# Patient Record
Sex: MALE | Race: WHITE | Employment: FULL TIME | URBAN - METROPOLITAN AREA
[De-identification: names, ages, dates, MRNs, and addresses within clinical notes are randomized per-mention and may not be internally consistent; named-entity substitution may affect disease eponyms.]

---

## 2020-01-07 PROBLEM — I10 HTN (HYPERTENSION): Status: ACTIVE | Noted: 2020-01-07

## 2020-01-07 PROBLEM — Z00.00 ANNUAL PHYSICAL EXAM: Status: ACTIVE | Noted: 2020-01-07

## 2020-01-07 PROBLEM — F17.210 TOBACCO DEPENDENCE DUE TO CIGARETTES: Status: ACTIVE | Noted: 2020-01-07

## 2020-01-07 PROBLEM — F39 MOOD DISORDER (HCC): Status: ACTIVE | Noted: 2020-01-07

## 2020-02-20 PROBLEM — E78.5 HLD (HYPERLIPIDEMIA): Status: ACTIVE | Noted: 2020-02-20

## 2020-10-06 PROBLEM — C06.9 ORAL CANCER (HCC): Status: ACTIVE | Noted: 2020-10-06

## 2021-07-01 PROBLEM — F39 MOOD DISORDER (HCC): Status: RESOLVED | Noted: 2020-01-07 | Resolved: 2021-07-01

## 2021-07-01 PROBLEM — F10.10 ALCOHOL USE DISORDER, MILD, ABUSE: Status: ACTIVE | Noted: 2021-07-01

## 2021-11-01 ENCOUNTER — HOSPITAL ENCOUNTER (OUTPATIENT)
Dept: GENERAL RADIOLOGY | Age: 51
Discharge: HOME | End: 2021-11-01
Payer: COMMERCIAL

## 2021-11-01 DIAGNOSIS — S89.91XA KNEE INJURY, RIGHT, INITIAL ENCOUNTER: ICD-10-CM

## 2021-11-01 PROBLEM — B36.9 FUNGAL DERMATITIS: Status: ACTIVE | Noted: 2021-11-01

## 2021-11-01 PROCEDURE — 73562 X-RAY EXAM OF KNEE 3: CPT

## 2022-01-27 PROBLEM — M19.90 OA (OSTEOARTHRITIS): Status: ACTIVE | Noted: 2022-01-27

## 2022-03-18 PROBLEM — F10.10 ALCOHOL USE DISORDER, MILD, ABUSE: Status: ACTIVE | Noted: 2021-07-01

## 2022-03-19 PROBLEM — E78.5 HLD (HYPERLIPIDEMIA): Status: ACTIVE | Noted: 2020-02-20

## 2022-03-19 PROBLEM — Z00.00 ANNUAL PHYSICAL EXAM: Status: ACTIVE | Noted: 2020-01-07

## 2022-03-19 PROBLEM — I10 HTN (HYPERTENSION): Status: ACTIVE | Noted: 2020-01-07

## 2022-03-19 PROBLEM — M19.90 OA (OSTEOARTHRITIS): Status: ACTIVE | Noted: 2022-01-27

## 2022-03-19 PROBLEM — F17.210 TOBACCO DEPENDENCE DUE TO CIGARETTES: Status: ACTIVE | Noted: 2020-01-07

## 2022-03-20 PROBLEM — B36.9 FUNGAL DERMATITIS: Status: ACTIVE | Noted: 2021-11-01

## 2022-03-20 PROBLEM — C06.9 ORAL CANCER (HCC): Status: ACTIVE | Noted: 2020-10-06

## 2022-05-24 DIAGNOSIS — E78.5 HYPERLIPIDEMIA, UNSPECIFIED HYPERLIPIDEMIA TYPE: ICD-10-CM

## 2022-05-24 DIAGNOSIS — I10 HYPERTENSION, UNSPECIFIED TYPE: Primary | ICD-10-CM

## 2022-07-29 ENCOUNTER — TELEPHONE (OUTPATIENT)
Dept: FAMILY MEDICINE CLINIC | Facility: CLINIC | Age: 52
End: 2022-07-29

## 2022-08-01 DIAGNOSIS — I10 HYPERTENSION, UNSPECIFIED TYPE: Primary | ICD-10-CM

## 2022-08-01 NOTE — TELEPHONE ENCOUNTER
Yes he needs an appointment; see below: Follow-up and Dispositions    Return in about 6 months (around 7/27/2022) for CPX/AWV, Pre-Visit LABS.     For today will provide him with a 30 D supply of Losartan in order for him to have time to schedule labs & visit; tks    Attempted to sign script but there is no pharmacy on file; will need that in order to send script

## 2022-08-01 NOTE — TELEPHONE ENCOUNTER
Pt was last prescribed Olmesartan rather than Losartan (Cozaar) a few months ago. Please confirm w/pt which med he's been taking for the last few months & the dosage so we can make sure the RF is for the correct med.  Tks

## 2022-08-01 NOTE — TELEPHONE ENCOUNTER
Needs a refill on losartan. He hasn't been seen for hypertension since march. And has not future appointment to follow up with his hypertension. Would you like for him to have an appointment?

## 2022-08-02 DIAGNOSIS — E78.5 HYPERLIPIDEMIA, UNSPECIFIED HYPERLIPIDEMIA TYPE: ICD-10-CM

## 2022-08-02 DIAGNOSIS — I10 HYPERTENSION, UNSPECIFIED TYPE: ICD-10-CM

## 2022-08-03 DIAGNOSIS — I10 HYPERTENSION, UNSPECIFIED TYPE: ICD-10-CM

## 2022-08-03 RX ORDER — LOSARTAN POTASSIUM 50 MG/1
50 TABLET ORAL DAILY
Qty: 30 TABLET | Refills: 0 | Status: SHIPPED | OUTPATIENT
Start: 2022-08-03 | End: 2022-08-03 | Stop reason: SDUPTHER

## 2022-08-03 RX ORDER — LOSARTAN POTASSIUM 50 MG/1
50 TABLET ORAL DAILY
Qty: 30 TABLET | Refills: 0 | Status: SHIPPED | OUTPATIENT
Start: 2022-08-03 | End: 2022-08-12 | Stop reason: SDUPTHER

## 2022-08-03 NOTE — TELEPHONE ENCOUNTER
Needs refill on losartan, Went to pharmacy to pick it up and it wasn't there. Can you resent the losartan order.

## 2022-08-05 LAB
ALBUMIN SERPL-MCNC: 3.9 G/DL (ref 3.5–5)
ALBUMIN/GLOB SERPL: 1.1 {RATIO} (ref 1.2–3.5)
ALP SERPL-CCNC: 65 U/L (ref 50–136)
ALT SERPL-CCNC: 28 U/L (ref 12–65)
ANION GAP SERPL CALC-SCNC: 6 MMOL/L (ref 7–16)
AST SERPL-CCNC: 32 U/L (ref 15–37)
BILIRUB SERPL-MCNC: 0.6 MG/DL (ref 0.2–1.1)
BUN SERPL-MCNC: 7 MG/DL (ref 6–23)
CALCIUM SERPL-MCNC: 9.1 MG/DL (ref 8.3–10.4)
CHLORIDE SERPL-SCNC: 101 MMOL/L (ref 98–107)
CHOLEST SERPL-MCNC: 205 MG/DL
CO2 SERPL-SCNC: 29 MMOL/L (ref 21–32)
CREAT SERPL-MCNC: 0.8 MG/DL (ref 0.8–1.5)
GLOBULIN SER CALC-MCNC: 3.4 G/DL (ref 2.3–3.5)
GLUCOSE SERPL-MCNC: 72 MG/DL (ref 65–100)
HDLC SERPL-MCNC: 59 MG/DL (ref 40–60)
HDLC SERPL: 3.5 {RATIO}
LDLC SERPL CALC-MCNC: 111.2 MG/DL
POTASSIUM SERPL-SCNC: 4.5 MMOL/L (ref 3.5–5.1)
PROT SERPL-MCNC: 7.3 G/DL (ref 6.3–8.2)
SODIUM SERPL-SCNC: 136 MMOL/L (ref 138–145)
TRIGL SERPL-MCNC: 174 MG/DL (ref 35–150)
VLDLC SERPL CALC-MCNC: 34.8 MG/DL (ref 6–23)

## 2022-08-12 ENCOUNTER — OFFICE VISIT (OUTPATIENT)
Dept: FAMILY MEDICINE CLINIC | Facility: CLINIC | Age: 52
End: 2022-08-12
Payer: COMMERCIAL

## 2022-08-12 VITALS
TEMPERATURE: 97.1 F | OXYGEN SATURATION: 97 % | HEART RATE: 73 BPM | WEIGHT: 142.2 LBS | SYSTOLIC BLOOD PRESSURE: 155 MMHG | HEIGHT: 67 IN | BODY MASS INDEX: 22.32 KG/M2 | DIASTOLIC BLOOD PRESSURE: 86 MMHG

## 2022-08-12 DIAGNOSIS — Z00.00 ANNUAL PHYSICAL EXAM: Primary | ICD-10-CM

## 2022-08-12 DIAGNOSIS — E78.5 HYPERLIPIDEMIA, UNSPECIFIED HYPERLIPIDEMIA TYPE: ICD-10-CM

## 2022-08-12 DIAGNOSIS — F10.10 ALCOHOL USE DISORDER, MILD, ABUSE: ICD-10-CM

## 2022-08-12 DIAGNOSIS — I10 HYPERTENSION, UNSPECIFIED TYPE: ICD-10-CM

## 2022-08-12 DIAGNOSIS — M15.9 OSTEOARTHRITIS OF MULTIPLE JOINTS, UNSPECIFIED OSTEOARTHRITIS TYPE: ICD-10-CM

## 2022-08-12 DIAGNOSIS — F17.210 TOBACCO DEPENDENCE DUE TO CIGARETTES: ICD-10-CM

## 2022-08-12 PROBLEM — Z85.819 HISTORY OF ORAL CANCER: Status: ACTIVE | Noted: 2020-10-06

## 2022-08-12 PROCEDURE — 99396 PREV VISIT EST AGE 40-64: CPT | Performed by: FAMILY MEDICINE

## 2022-08-12 PROCEDURE — 99214 OFFICE O/P EST MOD 30 MIN: CPT | Performed by: FAMILY MEDICINE

## 2022-08-12 RX ORDER — FOLIC ACID 1 MG/1
1 TABLET ORAL DAILY
Qty: 90 TABLET | Refills: 1 | Status: SHIPPED | OUTPATIENT
Start: 2022-08-12

## 2022-08-12 RX ORDER — ROSUVASTATIN CALCIUM 10 MG/1
10 TABLET, COATED ORAL EVERY EVENING
Qty: 90 TABLET | Refills: 1 | Status: SHIPPED | OUTPATIENT
Start: 2022-08-12

## 2022-08-12 RX ORDER — THIAMINE MONONITRATE (VIT B1) 100 MG
100 TABLET ORAL DAILY
COMMUNITY

## 2022-08-12 RX ORDER — DICLOFENAC SODIUM 75 MG/1
75 TABLET, DELAYED RELEASE ORAL 2 TIMES DAILY PRN
Qty: 60 TABLET | Refills: 5 | Status: SHIPPED | OUTPATIENT
Start: 2022-08-12

## 2022-08-12 RX ORDER — LOSARTAN POTASSIUM 100 MG/1
100 TABLET ORAL DAILY
Qty: 90 TABLET | Refills: 1 | Status: SHIPPED | OUTPATIENT
Start: 2022-08-12

## 2022-08-12 RX ORDER — LANOLIN ALCOHOL/MO/W.PET/CERES
100 CREAM (GRAM) TOPICAL DAILY
Qty: 90 TABLET | Refills: 1 | Status: SHIPPED | OUTPATIENT
Start: 2022-08-12

## 2022-08-12 ASSESSMENT — ENCOUNTER SYMPTOMS
COLOR CHANGE: 0
SHORTNESS OF BREATH: 0
CONSTIPATION: 0
BLOOD IN STOOL: 0
ABDOMINAL PAIN: 0
DIARRHEA: 0

## 2022-08-12 ASSESSMENT — PATIENT HEALTH QUESTIONNAIRE - PHQ9
SUM OF ALL RESPONSES TO PHQ9 QUESTIONS 1 & 2: 0
2. FEELING DOWN, DEPRESSED OR HOPELESS: 0
SUM OF ALL RESPONSES TO PHQ QUESTIONS 1-9: 0
1. LITTLE INTEREST OR PLEASURE IN DOING THINGS: 0
SUM OF ALL RESPONSES TO PHQ QUESTIONS 1-9: 0

## 2022-08-12 NOTE — PROGRESS NOTES
[facility]  [address] [place] [address]  Phone: [phone]  Fax: [phone]  [email or web address]      Encounter 5 [facility]; Established patient 45 y.o.male; seen 8/12/2022 for: Annual Exam, Hypertension, and Arthritis      Assessment & Plan    1. Annual physical exam  Assessment & Plan:  Discussed ideal body weight and encouraged regular physical activity and healthy diet. Recommended routine preventative measures such as always wearing seatbelt & home safety measures. Counseled the patient regarding the rationale for the recommended immunizations and screenings and they are current and/or updated. 2. Hypertension, unspecified type  Assessment & Plan:  Problem and/or Symptoms are currently not stable and/or well controlled on current treatment plan. Will have patient follow up as directed and make the following changes for further evaluation and/or treatment:     Increasing Losartan from 75 mg QD to 100 mg QD & f/u response next visit  Orders:  -     losartan (COZAAR) 100 MG tablet; Take 1 tablet by mouth in the morning., Disp-90 tablet, R-1Normal  -     Comprehensive Metabolic Panel; Future  3. Hyperlipidemia, unspecified hyperlipidemia type  Assessment & Plan:  Problem and/or Symptoms are currently not stable and/or well controlled on current treatment plan. Will have patient follow up as directed and make the following changes for further evaluation and/or treatment:     Starting Crestor 10 mg QD & f/u response next visit  Orders:  -     rosuvastatin (CRESTOR) 10 MG tablet; Take 1 tablet by mouth every evening, Disp-90 tablet, R-1Normal  -     Comprehensive Metabolic Panel; Future  -     Lipid Panel; Future  4. Osteoarthritis of multiple joints, unspecified osteoarthritis type  Assessment & Plan:  Problem and/or Symptoms are currently stable and/or improving on current treatment plan. Will continue and have patient follow up as directed.     Pertinent labs reviewed & pertinent meds refilled X 6 M    Orders:  -     diclofenac (VOLTAREN) 75 MG EC tablet; Take 1 tablet by mouth 2 times daily as needed for Pain, Disp-60 tablet, R-5Normal  5. Tobacco dependence due to cigarettes  Assessment & Plan:  Advised patient regarding the deleterious effects of tobacco use including increased risk of: stroke, heart attack, high blood pressure, cancer, etc. Patient voices understanding of risks and acknowledges it would be better if the patient avoids further tobacco exposure. Discussed with patient possible options to help with tobacco cessation including: nicotine gum/patches/vapor and medication options. 6. Alcohol use disorder, mild, abuse  Assessment & Plan:  Problem and/or Symptoms are currently stable and/or improving on current treatment plan. Will continue and have patient follow up as directed. RF supplements X 6 M  Orders:  -     thiamine 100 MG tablet; Take 1 tablet by mouth in the morning., Disp-90 tablet, [id number]  -     folic acid (FOLVITE) 1 MG tablet; Take 1 tablet by mouth in the morning., Disp-90 tablet, R-1Normal        Check Out Instructions  Return in about 6 months (around 2/12/2023) for Virtual Visit, Previsit Vitals and Labs. Subjective & Objective    HPI  Patient states that chronic health problems are stable on current regimens and has no acute concerns today except as mentioned. Review of Systems   Constitutional:  Negative for fatigue and unexpected weight change. Eyes:  Negative for visual disturbance. Respiratory:  Negative for shortness of breath. Cardiovascular:  Negative for chest pain. Gastrointestinal:  Negative for abdominal pain, blood in stool, constipation and diarrhea. Genitourinary:  Negative for difficulty urinating and hematuria. Musculoskeletal:  Negative for arthralgias and myalgias. Skin:  Negative for color change. Neurological:  Negative for weakness and headaches.    Psychiatric/Behavioral:  Negative for dysphoric mood. The patient is not nervous/anxious. Physical Exam  Vitals and nursing note reviewed. Constitutional:       Appearance: Normal appearance. HENT:      Head: Normocephalic and atraumatic. Cardiovascular:      Rate and Rhythm: Normal rate and regular rhythm. Pulses: Normal pulses. Heart sounds: Normal heart sounds. Pulmonary:      Effort: Pulmonary effort is normal. No respiratory distress. Breath sounds: Normal breath sounds. Musculoskeletal:         General: No deformity. Neurological:      Mental Status: He is alert. Mental status is at baseline. Psychiatric:         Mood and Affect: Mood normal.         Behavior: Behavior normal.     Vitals:    08/12/22 0837 08/12/22 0839   BP: (!) 175/92 (!) 155/86   Site: Left Upper Arm    Position: Sitting    Cuff Size: Large Adult    Pulse: 73    Temp: 97.1 °F (36.2 °C)    TempSrc: Temporal    SpO2: 97%    Weight: 142 lb 3.2 oz (64.5 kg)    Height: 5' 7\" (1.702 m)      BP Readings from Last 3 Encounters:   08/12/22 (!) 155/86   03/11/22 137/79   11/01/21 (!) 144/88     Body mass index is 22.27 kg/m². Wt Readings from Last 3 Encounters:   08/12/22 142 lb 3.2 oz (64.5 kg)   11/01/21 146 lb 12.8 oz (66.6 kg)   07/01/21 148 lb 9.6 oz (67.4 kg)       We discussed the typical prognosis and potential complications of the concern(s), including treatment options. Medication risks, benefits, costs, interactions, and alternatives were discussed as appropriate. I advised him to contact the office if his condition worsens or fails to improve as anticipated. He expressed understanding with the discussion and plan of care. An electronic signature was used to authenticate this note.   -- Hemalatha Morgan MD       Health Maintenance    Vaccinations (most recent date)  Influenza (Yearly): declines  Tetanus (Q10 Years): 2017  Shingles (Age 52+): advised to get  Pneumovax (Age 65+): 2020    Cancer Screening (most recent date)  Colon (Age 45-75): Cologuard in 2020; repeat in 2023  Breast (Age 39-70 Q2 Years): na  Cervical (Age 21-29 Pap Q3 Years): na  Cervical (Age 33-67 HPV Q5 Years): na    Disease Screening (most recent date)  Cholesterol (Age 21-72 Q8 Years): today  Diabetes (Age 38-68 Q2 Years): today  T/A/D Use:  reports that he has been smoking cigarettes. He has been smoking an average of .5 packs per day. He has never used smokeless tobacco. He reports current alcohol use of about 2.0 standard drinks per week. He reports that he does not use drugs. STD's (Yearly): declines  Bone Density (Age 65+ if female): na  AAA Screen (Age 73-68 if male & ever smoked): na    No flowsheet data found. Other Providers Seen  Patient Care Team:  Bishnu Reaves MD as PCP - General  Bishnu Reaves MD as PCP - [facility] Empaneled Provider    Risk Factors  Risk factors (depression, fall risk, smoking, poor nutrition, physical inactivity, obesity, etc) were identified and appropriate counseling given and/or referrals were made as appropriate. If no counseling given and/or referrals were made then no risk factors as listed were identified or there is already a treatment plan in place to address said risk factors. The patient's chart was reviewed and updated as appropriate including: problem list, current medications, past medical, surgical, & social history, family history, allergies, & care teams.

## 2022-08-12 NOTE — ASSESSMENT & PLAN NOTE
Problem and/or Symptoms are currently stable and/or improving on current treatment plan. Will continue and have patient follow up as directed.     RF supplements X 6 M

## 2022-08-12 NOTE — ASSESSMENT & PLAN NOTE
Advised patient regarding the deleterious effects of tobacco use including increased risk of: stroke, heart attack, high blood pressure, cancer, etc. Patient voices understanding of risks and acknowledges it would be better if the patient avoids further tobacco exposure. Discussed with patient possible options to help with tobacco cessation including: nicotine gum/patches/vapor and medication options.

## 2022-09-21 ENCOUNTER — TELEPHONE (OUTPATIENT)
Dept: FAMILY MEDICINE CLINIC | Facility: CLINIC | Age: 52
End: 2022-09-21

## 2022-09-21 NOTE — TELEPHONE ENCOUNTER
Patient called just let you know that he was bitten by a dog while working yesterday. He went to  to get the wound cleaned up. He said that he did call animal control to let them know. They were suppose to go talk to the owners but has not hurt anything. Stated there was no signs of infections. Pet owners said that he is updated on his shot but he has no proof.

## 2022-09-21 NOTE — TELEPHONE ENCOUNTER
Noted; tks for the update. If he develops any Sx of infection he should be seen to evaluate; otherwise just conservative care for the wound.

## 2022-10-20 ENCOUNTER — TELEPHONE (OUTPATIENT)
Dept: FAMILY MEDICINE CLINIC | Facility: CLINIC | Age: 52
End: 2022-10-20

## 2022-10-21 ENCOUNTER — OFFICE VISIT (OUTPATIENT)
Dept: FAMILY MEDICINE CLINIC | Facility: CLINIC | Age: 52
End: 2022-10-21
Payer: COMMERCIAL

## 2022-10-21 VITALS
SYSTOLIC BLOOD PRESSURE: 154 MMHG | BODY MASS INDEX: 22.44 KG/M2 | HEIGHT: 67 IN | HEART RATE: 90 BPM | DIASTOLIC BLOOD PRESSURE: 84 MMHG | TEMPERATURE: 98 F | WEIGHT: 143 LBS

## 2022-10-21 DIAGNOSIS — M25.561 PAIN AND SWELLING OF RIGHT KNEE: Primary | ICD-10-CM

## 2022-10-21 DIAGNOSIS — I10 HYPERTENSION, UNSPECIFIED TYPE: ICD-10-CM

## 2022-10-21 DIAGNOSIS — M25.461 PAIN AND SWELLING OF RIGHT KNEE: Primary | ICD-10-CM

## 2022-10-21 PROCEDURE — 99214 OFFICE O/P EST MOD 30 MIN: CPT | Performed by: NURSE PRACTITIONER

## 2022-10-21 SDOH — ECONOMIC STABILITY: FOOD INSECURITY: WITHIN THE PAST 12 MONTHS, YOU WORRIED THAT YOUR FOOD WOULD RUN OUT BEFORE YOU GOT MONEY TO BUY MORE.: NEVER TRUE

## 2022-10-21 SDOH — ECONOMIC STABILITY: FOOD INSECURITY: WITHIN THE PAST 12 MONTHS, THE FOOD YOU BOUGHT JUST DIDN'T LAST AND YOU DIDN'T HAVE MONEY TO GET MORE.: NEVER TRUE

## 2022-10-21 ASSESSMENT — PATIENT HEALTH QUESTIONNAIRE - PHQ9
1. LITTLE INTEREST OR PLEASURE IN DOING THINGS: 0
SUM OF ALL RESPONSES TO PHQ QUESTIONS 1-9: 0
2. FEELING DOWN, DEPRESSED OR HOPELESS: 0
SUM OF ALL RESPONSES TO PHQ QUESTIONS 1-9: 0
SUM OF ALL RESPONSES TO PHQ9 QUESTIONS 1 & 2: 0

## 2022-10-21 ASSESSMENT — SOCIAL DETERMINANTS OF HEALTH (SDOH): HOW HARD IS IT FOR YOU TO PAY FOR THE VERY BASICS LIKE FOOD, HOUSING, MEDICAL CARE, AND HEATING?: NOT HARD AT ALL

## 2022-10-21 NOTE — PROGRESS NOTES
Subjective:      Patient ID: Mikey Steele is a 47 y.o. male. He is here for right knee pain started  wednesday at 4 pm. Has swelling and pressure in the back of his knee  Took a day off but knee was not improving. NO precipitating injury. Has had the knee scoped 10 years ago. Can not remember the name of the orthopedist he saw  BP up. Already takes voltaren daily for other ms pain. Can straighten leg completely but has trouble bending completely  Is aware bp is up continues to smoke address to see dr Fredis Cristobal to address this in the near future    HPI    Review of Systems   Musculoskeletal:         Right knee swelling and pain     Objective:   Physical Exam  Vitals and nursing note reviewed. Constitutional:       Appearance: Normal appearance. Cardiovascular:      Rate and Rhythm: Normal rate and regular rhythm. Heart sounds: Normal heart sounds. Pulmonary:      Effort: Pulmonary effort is normal.      Breath sounds: Normal breath sounds. Comments: Smells of tobacco smoke  Musculoskeletal:         General: Swelling present. No tenderness or deformity. Comments: Unable to bend more than 100 Degrees due to swelling. No redness no exquisite tenderness no instability in the joint effusion noted particular in posterior knee   Neurological:      Mental Status: He is alert. Assessment:      BP (!) 154/84 (Site: Right Upper Arm, Position: Sitting, Cuff Size: Medium Adult)   Pulse 90   Temp 98 °F (36.7 °C) (Temporal)   Ht 5' 7\" (1.702 m)   Wt 143 lb (64.9 kg)   BMI 22.40 kg/m²        Plan:      Pain and swelling of right knee  -     XR KNEE RIGHT (1-2 VIEWS); Future  Hypertension, unspecified type    Xray ordered continue voltaren for pain ice elevate will see what xray shows and he is to find out name of prior ortho for a new referral to see him.  Is urged to see dr Hammad Brown about uncontrolled htn As he declines any adjustment today    SOFIA Hebert NP

## 2022-10-24 ENCOUNTER — TELEPHONE (OUTPATIENT)
Dept: FAMILY MEDICINE CLINIC | Facility: CLINIC | Age: 52
End: 2022-10-24

## 2022-10-24 DIAGNOSIS — M25.561 ACUTE PAIN OF RIGHT KNEE: Primary | ICD-10-CM

## 2022-10-24 DIAGNOSIS — M25.561 PAIN AND SWELLING OF RIGHT KNEE: ICD-10-CM

## 2022-10-24 DIAGNOSIS — M25.461 PAIN AND SWELLING OF RIGHT KNEE: ICD-10-CM

## 2022-10-24 NOTE — TELEPHONE ENCOUNTER
----- Message from Umang Modi MA sent at 10/24/2022  3:33 PM EDT -----    ----- Message -----  From: SOFIA Goins NP  Sent: 10/24/2022   8:27 AM EDT  To: Umang Modi MA    Xray shows an effusion but no RA did you figure out the ortho that you saw in the past so we can refer you there again?

## 2022-10-24 NOTE — TELEPHONE ENCOUNTER
Pt stated that the drYoselyn Was Dr. Monica Daniel. For ortho, but doesn't really want see him again.

## 2022-10-24 NOTE — TELEPHONE ENCOUNTER
----- Message from Mayo Herman MA sent at 10/24/2022  3:33 PM EDT -----    ----- Message -----  From: SOFIA Walden NP  Sent: 10/24/2022   8:27 AM EDT  To: Mayo Herman MA    Xray shows an effusion but no RA did you figure out the ortho that you saw in the past so we can refer you there again?

## 2022-10-27 ENCOUNTER — TELEPHONE (OUTPATIENT)
Dept: ORTHOPEDIC SURGERY | Age: 52
End: 2022-10-27

## 2022-10-27 ENCOUNTER — OFFICE VISIT (OUTPATIENT)
Dept: ORTHOPEDIC SURGERY | Age: 52
End: 2022-10-27
Payer: COMMERCIAL

## 2022-10-27 DIAGNOSIS — M25.561 CHRONIC PAIN OF RIGHT KNEE: Primary | ICD-10-CM

## 2022-10-27 DIAGNOSIS — G89.29 CHRONIC PAIN OF RIGHT KNEE: Primary | ICD-10-CM

## 2022-10-27 DIAGNOSIS — M17.11 PRIMARY OSTEOARTHRITIS OF RIGHT KNEE: ICD-10-CM

## 2022-10-27 PROCEDURE — 99204 OFFICE O/P NEW MOD 45 MIN: CPT | Performed by: STUDENT IN AN ORGANIZED HEALTH CARE EDUCATION/TRAINING PROGRAM

## 2022-10-27 PROCEDURE — 20611 DRAIN/INJ JOINT/BURSA W/US: CPT | Performed by: STUDENT IN AN ORGANIZED HEALTH CARE EDUCATION/TRAINING PROGRAM

## 2022-10-27 PROCEDURE — L1820 KO ELAS W/ CONDYLE PADS & JO: HCPCS | Performed by: STUDENT IN AN ORGANIZED HEALTH CARE EDUCATION/TRAINING PROGRAM

## 2022-10-27 RX ORDER — MELOXICAM 15 MG/1
15 TABLET ORAL DAILY
Qty: 14 TABLET | Refills: 0 | Status: SHIPPED | OUTPATIENT
Start: 2022-10-27 | End: 2022-11-10

## 2022-10-27 RX ORDER — METHYLPREDNISOLONE ACETATE 40 MG/ML
40 INJECTION, SUSPENSION INTRA-ARTICULAR; INTRALESIONAL; INTRAMUSCULAR; SOFT TISSUE ONCE
Status: COMPLETED | OUTPATIENT
Start: 2022-10-27 | End: 2022-10-27

## 2022-10-27 RX ADMIN — METHYLPREDNISOLONE ACETATE 40 MG: 40 INJECTION, SUSPENSION INTRA-ARTICULAR; INTRALESIONAL; INTRAMUSCULAR; SOFT TISSUE at 09:38

## 2022-10-27 NOTE — PROGRESS NOTES
Name: Naila Barfield  YOB: 1970  Gender: male  MRN: [id number]  Date of Encounter:  10/27/2022       CHIEF COMPLAINT:     Chief Complaint   Patient presents with    Knee Pain        SUBJECTIVE/OBJECTIVE:      HPI:    Patient is a 47 y.o. pleasant male who presents today for a new evaluation of his right knee. Pain has been ongoing for the past several weeks, since the change in weather. He has a history of having similar pain a year ago after a fall. He has swelling to the knee and this causes tightness. He generally feels pain more around his medial joint line. He denies any new injuries. He has been out of work the past week due to pain and  evaluations for the knee pain. He has been wearing a brace that was given to him from previous surgery years ago. He also ice his knee. He has not been taking any medications. Ports history of a he had a knee arthroscopy around 11 years ago with Dr. Cassia Brizuela at [facility] for a \"cleanout \"but he is not certain of the details of this surgery. PAST HISTORY:   Past medical, surgical, family, social history and allergies reviewed by me. Pertinent history:   Tobacco use:  reports that he has been smoking cigarettes. He has been smoking an average of .5 packs per day. He has never used smokeless tobacco.  Diabetes: none  CKD: no  Anticoagulation: no      REVIEW OF SYSTEMS:   As noted in HPI. PHYSICAL EXAMINATION:     Gen: Well-developed, no acute distress   HEENT: NC/AT, EOMI   Neck: Trachea midline, normal ROM   CV: Regular rhythm by palpation of distal pulse, normal capillary refill   Pulm: No respiratory distress, no stridor   Psychiatric: Well oriented, normal mood and affect. Skin: No rashes, lesions or ulcers, normal temperature, turgor, and texture on uninvolved extremity.       ORTHO EXAM:    Right knee:     Alignment: normal  Inspection: No deformity, No edema, No ecchymosis  Palpation: Effusion  moderate; Crepitus Positive, Patellar mobility normal  ROM: 130 flexion, 0 extension   Tenderness: Medial joint line, Lateral joint line, Medial patellar facet  Provocative testing: (-) Clarissa lateral, Clarissa medial ,   Strength: Extensor mechanism intact  Sensation: intact to light touch   Capillary refill normal    Gait: Normal      DIAGNOSTIC IMAGIN view x-ray taken 10/21/2022, nonweightbearing of AP and lateral right knee reviewed    Findings show patella Baha, mild effusion, medial compartment narrowing with no significant osteophyte development, valgus alignment, likely patellofemoral compartment narrowing    3 vw X-ray right knee taken  previously reviewed. Findings show medial joint narrowing, no osteophyte development, mild effusion, vascular atherosclerosis. ASSESSMENT/PLAN:   1. Chronic pain of right knee    2. Primary osteoarthritis of right knee         We discussed that his knee pain and swelling is likely secondary to osteoarthritis, possible meniscal degeneration. We discussed surgical and nonsurgical options for his knee pain. At this time he is not interested in pursuing any surgical management, and likely would not be recommended to have a knee replacement surgery at this time. We discussed variable treatment options including anti-inflammatories, and I prescribed him 2 weeks of Mobic for his effusion and pain. We discussed bracing and he was prescribed a ready hinged brace. He was encouraged to participate in therapy exercises, and a handout was provided today. We discussed variable injectable therapies including corticosteroid and viscosupplementation, and he wished to proceed with a corticosteroid injection today. We will see him back in 6 weeks for reevaluation. Right Knee Injection - US guided      An injection of corticosteroid was discussed today and is indicated for patients pain and condition today. All risks and benefits were discussed and patient wishes to proceed.  Prior to proceeding forward with a steroid injection to the right knee joint, proper informed consent was provided by the patient and is documented in the chart. Time-out was conducted with all members of the care team. The patient was placed in a supine position with the right slightly flexed to 30 degrees. A superior lateral approach was utilized for this injection procedure. The ultrasound probe was use to localize the joint capsule. The site of the injection was then cleansed chlorhexidine. A sterile gel was then placed over the area and the probe was repositioned to reveal the intraarticular space. Following this a vapo coolant spray was utilized to provide local skin anesthesia. A solution of 40mg Depo-medrol and 4cc 1% lidocaine was delivered through a 22 gauge, 1.5\" into the joint capsule. The site was again cleansed with an alcohol swab. The patient tolerated this procedure well with no adverse events. There was some notable pain relief reported by the patient prior to leaving the office today. The patient was counseled not to submerge the site for 24 hours, not to perform strenuous activity for the next five days, and if notes any signs or symptoms consistent with joint infection or allergic reaction to go to a local emergency room. The patient was observed for 15 minutes postprocedure and was allowed to be discharged from clinic in their usual state of health. Ultrasound use was deemed to be medically necessary to ensure appropriate placement of the injectate. Risks including infection, bleeding, nerve damage, and pain at the site of injection were discussed at length with the patient. Benefits including pain relief were also discussed with the patient. Patient verbalizes understanding of these and agrees to procedure. Images were saved to PACS system.       Orders / medications today:   Orders Placed This Encounter   Procedures    US ARTHR/ASP/INJ MAJOR JNT/BURSA RIGHT     Standing Status:   Future Standing Expiration Date:   10/27/2023    Reddie Hinged  Brace ()     Reddie Hinged  Brace ()     Standing Status:   Future     Number of Occurrences:   1     Standing Expiration Date:   1/27/2023      Follow up: Return in about 6 weeks (around 12/8/2022). The patient expressed understanding and agreed with the plan. Kaleb Polanco MD   [facility]     This document was created using voice recognition software so frequent mistakes are possible. For any concerns about the wording of this document, please contact its creator for further clarification.

## 2022-10-27 NOTE — LETTER
DME Patient Authorization Form    Name: Paul Acosta  : 1970  MRN: [id number]   Age: 47 y.o. Gender: male  Delivery Address: [facility]     Diagnosis:     ICD-10-CM    1. Right knee pain, unspecified chronicity  M25.561 [employer] ()           Requested DME:  [employer]- ($170.00) X 1 - right        Clinical Notes:     **Indicates non-covered items by insurance. Payment expected on date of service. Electronically signed by  Provider: Manasa Hernandez MD__Date: 10/27/2022                            [address] Tax ID # [id number]        Durable Medical Equipment and/or Orthotics Patient Consent     I understand that my physician has prescribed this medical supply as part of my treatment plan as a matter of Medical Necessity.  I understand that I have a choice in where I receive my prescribed orthopedic supplies and/or services.  I authorize [facility] to furnish this service/product and to provide my insurance carrier with any information requested in order to process for payment.  I instruct my insurance carrier to pay [facility] for these services/products.  I understand that my insurance carrier may deny payment for this supply because it is a non-covered item, deemed not medically necessary or considered experimental.   I understand that any cost not covered by my insurance carrier will be solely my financial responsibility.  I have received the Supplier Standards and have reviewed them.  I have received the prescribed item and have been fully instructed on the proper use of the above services/products.    ______ (Patient Initials) I understand that all DME items are non-returnable after being dispensed. Items still in sealed packaging may be returned up to 14 days after purchasing.  [facility] will replace items that are defective.    ______ (Patient Initials) I understand that Kelli Lora will not file a claim with my insurance carrier for this service/product and I am waiving my right to file a claim on my own for this service/product with my insurance company as this item is NON-COVERED (Denoted by the **) by my Insurance company/policy. ______ (Patient Initials) I understand that I am responsible to bring my equipment to the hospital for any surgery. ______________________________________________  _______________________  Patient / Kristine Romero            Thank you for considering [address] Your physician has prescribed specific medical equipment or devices for your home use. The following describes your rights and responsibilities as our customer. Right to Choose Providers: You have a choice regarding which company supplies your home medical equipment and devices, and to consult your physician in this decision. You may choose a medical supply store, a home medical equipment provider, or a specialist such as [facility]. [facility] will coordinate with your physician to provide the medical equipment or devices prescribed for your home use. Right to Service:  You have the right to considerate, respectful and nondiscriminatory care. You have the right to receive accurate and easily understood information about your health care. If you speak a foreign language, or don't understand the discussions, assistance will be provided to allow you to make informed health care decisions. You have the right to know your treatment options and to participate in decisions about your care, including the right to accept or refuse treatment. You have the right to expect a reasonable response to your requests for treatment or service.   You have the right to talk in confidence with health care providers and to have your health care information protected. You have the right to receive an explanation of your bill. You have the right to complain about the service or product you receive. Patient Responsibilities:  Please provide complete and accurate information about your health insurance benefits and make arrangements for the timely payment of your bill. [facility] will, if possible, assume responsibility for billing your insurance (Medicare, Medicaid and commercial) for the prescribed equipment or devices. If your policy does not cover the prescribed product, or only covers a portion of the bill, you are responsible for any remaining balance. Return and Exchange Policy:  [facility] will honor published  Warranties for products. [facility] will accept returns or exchanges within 14 days from the date of receipt, providin) the product must be in new condition; 2) receipt as required; and 3) used disposable and hygiene products may only be returned due to a defective product. Note: Refunds will be issued in a timely manner, please allow 4-6 weeks for processing. Complaint Procedures and DME Consumer Protection Resources:  [facility] values you as a customer, and is committed to resolving patient concerns. This commitment includes understanding and documenting your concerns, conducting a review of internal procedures, and providing you with an explanation and resolution to your concerns. Should you have any questions about our services or billing process, please contact our office at (practice phone number). If we are unable to resolve the concern, you have the right to direct comments to the office of Consumer Protection, in the [address] or the [facility] office, without fear of repercussion.     DMEPOS SUPPLIER STANDARDS    A supplier must be in compliance with all applicable Federal and State licensure and regulatory requirements. A supplier must provide complete and accurate information on the DMEPOS supplier application. Any changes to this information must be reported to the [facility] within 30 days. An authorized individual (one whose signature is binding) must sign the application for billing privileges. A supplier must fill orders from its own inventory, or must contract with other companies for the purchase of items necessary to fill the order. A supplier may not contract with any entity that is currently excluded from the Medicare program, any [facility] program, or from any other Federal procurement or Nonprocurement programs. A supplier must advise beneficiaries that they may rent or purchase inexpensive or routinely purchased durable medical equipment, and of the purchase option for capped rental equipment. A supplier must notify beneficiaries of warranty coverage and honor all warranties under applicable State Law, and repair or replace free of charge Medicare covered items that are under warranty. A supplier must maintain a physical facility on an appropriate site. A supplier must permit CMS, or its agents to conduct on-site inspections to ascertain the supplier's compliance with these standards. The supplier location must be accessible to beneficiaries during reasonable business hours, and must maintain a visible sign and posted hours of operation. A supplier must maintain a primary business telephone listed under the name of the business in a [employer] or a toll free number available through directory assistance. The exclusive use of a beeper, answering machine or cell phone is prohibited. A supplier must have comprehensive liability insurance in the amount of at least $300,000 that covers both the supplier's place of business and all customers and employees of the supplier.   If the supplier manufactures its own items, this insurance must also cover product liability and completed operations. A supplier must agree not to initiate telephone contact with beneficiaries, with a few exceptions allowed. This standard prohibits suppliers from calling beneficiaries in order to solicit new business. A supplier is responsible for delivery and must instruct beneficiaries on use of Medicare covered items, and maintain proof of delivery. A supplier must answer questions, and respond to complaints of the beneficiaries, and maintain documentation of such contacts. A supplier must maintain and replace at no charge or repair directly, or through a service contract with another company, Medicare covered items it has rented to beneficiaries. A supplier must accept returns of substandard (less than full quality for the particular item) or unsuitable items (inappropriate for the beneficiary at the time it was fitted and rented or sold) from beneficiaries. A supplier must disclose these supplier standards to each beneficiary to whom it supplies a Medicare-covered item. A supplier must disclose to the government any person having ownership, financial, or control interest in the supplier. A supplier must not convey or reassign a supplier number; i.e., the supplier may not sell or allow another entity to use its Medicare billing number. A supplier must have a complaint resolution protocol established to address beneficiary complaints that relate to these standards. A record of these complaints must be maintained at the physical facility. Complaint records must include: the name, address, telephone number and health insurance claim number of the beneficiary, a summary of the complaint, and any action taken to resolve it. A supplier must agree to furnish CMS any information required by the Medicare statute and implementing regulations.   A supplier of DMEPOS and other items and services must be accredited by a CMS-approved accreditation organization in order to receive and retain a supplier billing number. The accreditation must indicate the specific products and services, for which the supplier is accredited in order for the supplier to receive payment for those specific products and services. A DMEPOS supplier must notify their accreditation organization when a new DMEPOS location is opened. The accreditation organization may accredit the new supplier location for three months after it is operational without requiring a new site visit. All DMEPOS supplier locations, whether owned or subcontracted, must meet the [employer] and be separately accredited in order to bill Medicare. An accredited supplier may be denied enrollment or their enrollment may be revoked, if CMS determines that they are not in compliance with the DMEPOS quality standards. A DMEPOS supplier must disclose upon enrollment all products and services, including the addition of new product lines for which they are seeking accreditation. If a new product line is added after enrollment, the DMEPOS supplier will be responsible for notifying the accrediting body of the new product so that the DMEPOS supplier can be re-surveyed and accredited for these new products. Must meet the surety bond requirements specified in 42 C. F.R. 424.57(c). Implementation date- May 4, 2009. A supplier must obtain oxygen from a state-licensed oxygen supplier. A supplier must maintain ordering and referring documentation consistent with provisions found in 42 C. F.R. 424.516(f). DMEPOS suppliers are prohibited from sharing a practice location with certain other Medicare providers and suppliers. DMEPOS suppliers must remain open to the public for a minimum of 30 hours per week with certain exceptions.

## 2022-10-27 NOTE — PATIENT INSTRUCTIONS
ACL Reconstruction Rehabilitation Protocol    One of the most common complications following ACL reconstruction is loss of motion, especially loss of extension. Loss of knee extension has been shown to result in a limp, quadriceps muscle weakness, and anterior knee pain. Studies have demonstrated that the timing of ACL surgery has a significant influence on the development of postoperative knee stiffness. THE HIGHEST INCIDENCE OF KNEE STIFFNESS OCCURS IF ACL SURGERY IS PERFORMED WHEN THE KNEE IS SWOLLEN, PAINFUL, AND HAS A LIMITED RANGE OF MOTION. The risk of developing a stiff knee after surgery can be significantly reduced if the surgery is delayed until the acute inflammatory phase has passed, the swelling has subsided, a normal or near normal range of motion (especially extension) has been obtained, and a normal gait   pattern has been reestablished. Preoperative Rehabilitation Phase    Prepare for surgery using the information within this section. Goals:   * Control pain and swelling  * Restore normal range of motion  * Develop muscle strength sufficient for normal gait and ADL   * Mentally prepare the patient for surgery    Before proceeding with surgery the acutely injured knee should be in a quiescent state with little or no swelling, have a full range of motion, and the patient should have a normal or near normal gait pattern. More important than a predetermined time before performing surgery is the condition of the knee at the time of surgery. Use the following guidelines to prepare the knee for surgery:    Immobilize the knee    Following the acute injury you should use a knee immobilizer and crutches until you regain good muscular control of the leg. Extended use of the knee immobilizer should be limited to avoid quadriceps atrophy. You are encouraged to bear as much weight on the leg as is comfortable.     Control Pain and Swelling    Icing along with nonsteroidal anti-inflammatory medications such as Advil, Nuprin, Motrin, Ibuprofen, Aleve (2 tablets twice a day) are used to help control pain and swelling. The nonsteroidal anti-inflammatory medications are continued for 7 -10 days following the acute injury. Restore normal range of motion    You should attempt to achieve full range of motion as quickly as possible. Quadriceps isometrics exercises, straight leg raises, and range of motion exercises should be started immediately. Full extension is obtained by doing the following exercises:    1)  Passive knee extension. Sit in a chair and place your heel on the edge of a stool or chair. Relax the thigh muscles. Let the knee sag under it's own weight until maximum extension is achieved. 2) Heel Props:     Place the heel on a rolled towel making sure the heel is propped high enough to lift the thigh off the table. Allow the leg to relax into extension. 3 -4 times a day for 10 -15 minutes at a time. See Figure 1        3) Prone hang exercise. Lie face down on a table with the legs hanging off the edge of the table. Allow the legs to sag into full extension. Bending (Flexion) is obtained by doing the following exercises:    1) Passive knee bend    Sit on the edge of a table and let the knee bend under the influence of gravity. 2) Wall slides are used to further increase bending. Lie on the back with the involved foot on the wall and allow the foot to slide down the wall by bending the knee. Use other leg to apply pressure downward. 3) Heel slides are used to gain final degrees of flexion. (If you have a hamstring graft you will wait for your therapist to tell you to start these)    Pull the heel toward the buttocks, flexing the knee. Hold for 5 seconds. Straighten the leg by sliding the heel downward and hold for 5 seconds.        In later stages of rehabilitation, do heel slides by grasping the leg with both hands and pulling the heel toward the buttocks. Develop muscle strength    Once 100 degrees of flexion (bending) has been achieved you may begin to work on   muscular strength:    1) Stationary Bicycle. Use a stationary bicycle two times a day for 10 -20 minutes to help increase muscular strength, endurance, and maintain range of motion. See Figure 6        2) Swimming is also another exercise that can be done during this phase to develop muscle strength and maintain your range  of motion. 3) Low impact exercise machines such as an elliptical cross-, leg press machine, leg curl machine, and treadmill can also be used. This program should continue until you have achieved a full range of motion and good muscular control of the leg (you should be able to walk without a limp). Mentally prepare  Understand what to realistically expect of the surgery    Make arrangements with a physical therapist for post-operative rehabilitation    Make arrangements with your place of employment. Make arrangements with family and/or friends to help during the post-operative rehabilitation     Read and understand the rehabilitation phases after surgery      Understanding Surgery    This section provides an understanding of the pre and post-operative phases of surgery. Key terms:  Pain control, Drainage tube, Cryo cuff, Knee Immobilizer, AYAN Stocking    Before Surgery    Prior to beginning the operation, a nerve block may be used by your anesthesiologist. This solution will block the pain nerve fibers and local pain receptors in your knee. Recent studies have shown that this is a safe and effective way to control pain after knee surgery. In many cases the injection will last 12 or more hours after surgery and significantly reduce the amount of pain medication that you will have to take. After Surgery    Prior to leaving the operating room a knee immobilizer and an ice machine will be applied to your knee.    The ice machine will provide cold and compression, reducing pain and swelling. This unit should be used continuously for the first 3-4 days after your surgery. After this time period the can be used as needed for comfort. The Game Ready should be used for approximately 30 minutes, with 30 minutes between sessions. The postoperative knee brace helps to maintain extension and is to be worn at all times while walking and during sleeping, Otherwise it can be removed. After surgery, your leg will be wrapped in soft cotton bandage. You can slide the bandage down to change the dressings as needed. After the anesthesia has worn off, your vital signs are stable and your pain is under control you will be discharged from the hospital or surgical center. You will not be allowed to drive a car. Therefore prior to your discharge, you must arrange for transportation. Postoperative Days 1 -7    Follow the guidelines within this section for the first seven days after your surgery    IT IS EXTREMELY IMPORTANT THAT YOU WORK ON EXTENSION IMMEDIATELY. Goals:  * Control pain and swelling  * Care for the knee and dressing   * Early range of motion exercises  * Achieve and maintain full passive extension  * Prevent shutdown of the quadriceps muscles  * Gait training     Control Pain and Swelling    1) Control Swelling. Following discharge from the hospital you should go home elevate your leg and keep the knee iced using the cryo-cuff cooling unit. You may get up to use the bathroom and eat, but otherwise you should rest with your leg elevated. 2) Do not sit for long periods of time with your foot in a dependent position (lower than the rest of your body), as this will cause increased swelling in your knee and leg. When sitting for any significant period of time, elevate your leg and foot. 3) Control Pain. You will be sent home with a prescription for a strong narcotic medication.  You should take this for severe pain, as directed on the prescription bottle label. 4) As your pain and swelling decrease you can start to move around more and spend more time up on your crutches. Caring for your knee    1) The first night and day after the surgery you can expect the white elastic stocking and bandages to get bloody. This is normal! We want the blood to drain out of the knee on to the dressings rather than build-up in your knee and cause swelling and pain. If the dressings become extremely bloody or wet you should change or reinforce them as needed. Use the following directions for changing the dressing: The elastic stocking should be removed first followed by the cotton wrap and 4 inch x 4 inch gauze bandages. A clean, dry, 4 inch x 4 inch gauze bandage should be applied over the incisions and held in place a clean elastic dressing. Do not use tape to keep the gauze in place as this may cause skin blisters. The stocking will keep the gauze in place. 2) We recommend that you limit weight bearing to prevent swelling. 3) You can start using a stationary bike. Cycling is an excellent conditioning and building exercise for the quadriceps. Start with the seat fairly high and use a short diameter pedal if available so that the knee doesn't bend too much. At this early stage, you should just spin without any resistance. Use your good leg to turn the pedal.    4) You may shower, but you must keep your incisions dry for the first 7-10 days. This can be achieved by placing a waterproof dressing or plastic bag over your leg. 5) The sutures are absorbable and do not need to be removed. IT IS IMPORTANT TO KEEP THE INCISIONS DRYFOR THE FIRST 7-10DAYS. 6) A follow-up visit should be scheduled 2 weeks following the operation by contacting my office at [phone].    7) You may remove the knee brace while doing exercises or if you are in a safe, protected environment.  However, the knee immobilizer should be worn while sleeping for the first 4 weeks, and at all times while you walk for the first 6 weeks. Early Range of Motion and Extension    1) Passive extension of the knee by using a rolled towel. Note the towel must be high enough to raise the calf and thigh off the table. See   Figure 1on page 4. Remove the knee immobilizer from your knee every 2 -3 hours while awake  Position the heel on a pillow or rolled blanket with the knee unsupported  Passively let the knee sag into full extension for 10 -15 minutes. Relax your muscles, and gravity will cause the knee to sag into full extension. This exercise can also be done by sitting in a chair and supporting the heel on the edge of a stool, table or another chair and letting the unsupported knee sag into full extension. 2) Active-assisted extension is performed by using the opposite leg and your quadriceps muscles to straighten the knee from the 90 degree position to 0 degrees. Hyperextension should be avoided during this exercise. See Figure 7      3) Passive flexion (bending) of the knee to 90 degrees. (See Figure 8below)  Sit on the edge of a bed or table and letting gravity gently bend the knee. The opposite leg is used to support and control the amount of bending. This exercise should he performed 4 to 6 times a day for 10 minutes. It is important to achieve at least 90 degrees of passive flexion by 5 -7 days after surgery. Exercising Quadriceps     1) You should start quadriceps isometric contractions with the knee in the fully extended position as soon as possible. Do 3 sets of 10 repetitions 3 times a day. Each contraction should be held for a count of 6 sec. This exercise helps to prevent shut down of the quadriceps muscle and decreases swelling by squeezing fluid out of the knee joint. 2) Begin straight leg raises (SLR) with the knee immobilizer on 8 sets of 10 repetitions 3 times a day. Start by doing these exercises while lying down.      This exercise is performed by first performing a quadriceps contraction with the leg in full extension. The quadriceps contraction \"locks\" the knee and prevents excessive stress from being applied to the healing ACL graft. The leg is then kept straight and lifted to about 45-60 degrees and held for a count of six. The leg is then slowly lowered back on the bed. Relax the muscles. REMEMBER TO RELAX THE MUSCLES EACH TIME THE LEG TOUCHES DOWN    This exercise can be performed out of the brace when the leg can be held straight without sagging (quad lag). Once you have gained strength, straight leg exercises can be performed while seated. See Figure 9      Exercising Hamstrings    1) For patients who have had ACL reconstruction using the hamstring tendons it is important to avoid excessive stretching of the hamstring muscles during the first 6 weeks after surgery. The hamstring muscles need about 6 weeks to heal, and excessive hamstring stretching during this period can result in a \"pulled\" hamstring muscle and increased pain. Unintentional hamstring stretching commonly occurs when attempting to lean forward and put on your socks and shoes, or when  leaning forward to pick an object off the floor. To avoid re-injuring the hamstring muscles, bend your knee during the activities below, thus relaxing the hamstring muscles. 2) The hamstring muscles are exercised by pulling your heel back producing a hamstring contraction. See Figure 4. This exercise should be performed only if your own patellar tendon graft was used to reconstruction the ACL. If a hamstring tendon graft from your knee was used to reconstruct the ACL, this exercise should be avoided for the first 4 -6 weeks, as previously mentioned.     Postoperative Days 8 -10    Use the guidelines within this section for days 8-10 after your surgery    Goals:  Physical therapy  Maintain full extension  Returning to work    1) Schedule an office follow-up. 2) As the steri-strips get wet, they will peel off. Do not pull at them for the first 2 weeks. 3) After 3 weeks, you may apply vitamin E oil or another emollient to the incisions, as this will improve their appearance. 4) The appearance of your incision can be improved further if you keep direct sunlight off of it for one year. When exposed to the sun the incisions can be covered with a bandage, sunscreen with SPF of 30 to 50, or zinc oxide paste. Physical Therapy and Full Extension    1) Outpatient physical therapy will be modified during the first postoperative office visit. 2) Continue doing the quadriceps isometrics, SLR, active flexion, and active-assisted extension exercises. REMEMBER THAT IT IS EXTREMELY IMPORTANTTO CONTINUE TO REMOVE YOUR LEG FROM THE KNEE IMMOBILIZER 4TO 6TIMES A DAY FOR 10-15 MINUTES AT A TIME TO MAINTAIN FULL EXTENSION. Returning to Work    1)  As far as returning to work, if you have a desk type job you can return to work when your pain medication requirements decrease, and you can safely walk with your crutches. Typically this is between 5 -10 days after surgery. 2)  Patients who have jobs where light duty is not permitted; policemen, firemen, construction workers, laborers, will be out of work for a minimum of 6 -12 weeks. Postoperative Week 3    Use the guidelines in this section during the second week after your surgery    Goals:  * Maintain full extension  * Achieve 100 -120 degrees of flexion  * Develop enough muscular control to wean off knee immobilizer  * Control swelling in the knee    MAINTAINING FULL EXTENSION AND DEVELOPING MUSCULAR CONTROL ARE IMPORTANT    Maintain Full Extension    1) Continue with full passive extension (straightening), gravity assisted and active flexion, active-assisted extension, quadriceps isometrics, and straight leg raises.     2) Work toward  degrees of flexion (bending)    Develop Muscular Control    1) Start Partial Squats. Place feet at shoulder width in a slightly externally rotated position. Use a table for stability, and gently lower the buttocks backward and downward. Hold for 6 seconds and repeat. Do 3 sets of 10 repetitions each day. 2) Start Toe Raises. Using a table for stabilization, gently raise the heel off the floor and balance on the ball of the feet. Hold for 6 seconds and ease slowly back down. Do 3 sets of 10 repetitions each day. 3) Continue to use the knee brace for walking even if you have good muscle control of the leg. This will protect your graft. 4) Wean from crutches when you can put full weight on the leg and walk with a normal heal-toe gait and no limp. 5) You can continue using a stationary bike. Cycling is an excellent conditioning and building exercise for the quadriceps. See Figure 6 on page 6. The seat position is set so when the pedal is at the bottom, the ball of the foot is in contact with the pedal and there is a slight bend at the knee. No or low resistance used. Maintain good posture throughout the exercise. As your ability to pedal the bike with the operative leg improves, you may start to increase the resistance (around 5-6 weeks). Your objective is to slowly increase the time spent on the bike starting first at 5 minutes and eventually working up to 20 minutes a session. The resistance of the bike should be increased such that by the time you complete your work-out your muscles should \"burn\". THE BIKE IS ONE OF THE SAFEST MACHINES YOU CAN USE TO REHABILITATE YOUR KNEE, AND THERE IS NO LIMITATION ON HOW MUCH YOU USE IT. Control Pain and Swelling    1) At this point you should begin reducing the amount of narcotic pain medication you take. You will be instructed on how to do this during your follow-up appointment.     2) Once you have finished the anti-inflammatory that was given to you, you can take an over-the-counter anti-inflammatory medication, provided you have no history of stomach ulcer. The cheapest and simplest medication to take is Advil, Motrin, Nuprin or Aleve, 2 tablets twice a day. This medication will help to prevent scar tissue from forming in the knee, and also help to prevent blood clots from forming in your legs. When can you drive a car? REMEMBER, IT IS ILLEGAL TO TAKE PRESCRIPTION PAIN MEDICATIONS AND OPERATE A MOTOR VEHICLE! First, you must not be taking any prescription pain medications. Patients who have had surgery on the left knee, and who have an automatic transmission may drive when they can comfortably get the leg in and out of the car. During driving the knee brace can be unlocked. Patients who have had surgery on the left knee and have standard transmissions, should not drive until they have good muscular control of the leg. This usually takes 3-4 weeks. Patients who had surgery on the right knee should not drive until they have good muscular control of the leg. This usually takes 4-6 weeks. Postoperative Weeks 3 - 4    Goals:  * Full range of motion  * Strength through exercise    1) Expected range of motion is from full extension to 100 -120 degrees of flexion. Add wall slides (see Figure 3) and hand assisted heel drags to increase your range of motion. 2) Continue quadriceps isometrics and straight leg raises (see Figure 9). 3) Continue partial squats and toe raises (see Figure 10 and Figure 11). 4) If you belong to a health club or gym you may start to work on the following machines:    Stationary bike. Seat position regular height to high to avoid too much bending or straightening of the knee. Increase resistance as tolerated. Try to work up to 15-20 minutes a day. Elliptical cross- 15 -20 minutes a day. Inclined leg-press machine for the quadriceps muscles. 70 -0 degree range.  See Figure 12      Seated leg curls machine for the hamstring muscles. Note this exercise should be delayed until the postoperative week 8-10 if your ACL was reconstructed with a hamstring tendon graft. Upper body exercise machines. Swimming: pool walking, flutter kick (from the hip), water bicycle, water jogging. No diving, or whip kicks. Postoperative Weeks 4 -6    Goals:  * 125 degrees of flexion pushing toward full flexion  * Continued strength building    1) Your expected range of motion should be full extension to 125 degrees. Start to push for full flexion. Walls slides added if your flexion range of motion is less than desired. 2) Continue quad sets, straight leg raises, partial squats, toe raises, stationary bike, elliptical machine, leg presses, and leg curls. 3) Tilt board or balance board exercises. This helps with your balance and proprioception (ability to sense your joint in space)    Postoperative Weeks 6 -12     By week 6, your range of motion should be full extension to at least 135 degrees of flexion. Goals:   * 135 degree of flexion  * Continued strength  * Introduce treadmill  1) Continue quad sets, straight leg raises, partial squats, toe raises, stationary bike, elliptical machine, leg presses, and leg curls. 2) Hamstring reconstruction patients can start leg curls in a sitting position. If you develop hamstring pain then decrease the amount of weight that you are lifting, otherwise you can increase the weight as tolerated. IT IS IMPORTANT TO AVOIDUSE OF A LEG CURL MACHINE THAT REQUIRES YOU TO LIE ON YOUR STOMACH. THIS MACHINE PUTS TOO MUCH STRAIN ON THE HEALING HAMSTRING MUSCLES, AND CAN RESULT IN YOU \"PULLING\"THE HAMSTRING MUSCLE. 3) Continue tilt board and balance board for balance training. 4) Continue swimming program.      5) Start treadmill (flat only). 6) You may begin outdoor bike riding on flat roads. NO MOUNTAIN BIKING OR HILL CLIMBING!       Postoperative Weeks 12 -20     Goals:  * Continued strength  * Introduce jogging and light running  * Introduce agility drills  * Determine need for ACL functional brace    1) Continue all of week 6 -12 strengthening exercises. 2) Start straight, forward and straight, backward jogging and light running program.     3) Start functional running program after jogging program is completed. 4) Optional fitting for ACL functional brace. 5) Start agility drills, zig-zags and cross over drills. 24 Weeks Postoperative (6 months)    This is the earliest you should plan on returning to full sports. Goals:   * Return to sports    To return to sports you should have:   Quadriceps strength at least 80% of the normal leg     Hamstring strength at least 80% of the normal leg     Full motion     No swelling     Good stability    Ability to complete a running program

## 2022-10-27 NOTE — PROGRESS NOTES
Reddie Hinge brace for patients right knee. I explained how the hinge on each side of the brace should line up with the patella. The patient was also instructed to tighten the strap distal to the patella first to insure the brace is anchored and prevents slipping, , then the top strap should be tightened. Patient read and signed documenting they understand and agree to [facility]'s current DME return policy.

## 2022-10-28 ENCOUNTER — TELEPHONE (OUTPATIENT)
Dept: FAMILY MEDICINE CLINIC | Facility: CLINIC | Age: 52
End: 2022-10-28

## 2022-10-28 ENCOUNTER — TELEPHONE (OUTPATIENT)
Dept: ORTHOPEDIC SURGERY | Age: 52
End: 2022-10-28

## 2022-10-28 NOTE — TELEPHONE ENCOUNTER
He is calling to see what the topical cream was that she talked about. I told him Voltaren gel is the one that used to be a prescription but is not OTC.

## 2022-11-03 ENCOUNTER — TELEPHONE (OUTPATIENT)
Dept: ORTHOPEDIC SURGERY | Age: 52
End: 2022-11-03

## 2022-11-03 NOTE — TELEPHONE ENCOUNTER
He's having issues with his right knee and he had a cortisone shot but it has not helped at all.  Please call to discuss

## 2022-11-07 ENCOUNTER — TELEPHONE (OUTPATIENT)
Dept: ORTHOPEDIC SURGERY | Age: 52
End: 2022-11-07

## 2022-11-08 ENCOUNTER — TELEPHONE (OUTPATIENT)
Dept: ORTHOPEDIC SURGERY | Age: 52
End: 2022-11-08

## 2022-11-08 NOTE — TELEPHONE ENCOUNTER
Patient wants to let Dr Gabriel Caldera know he is still having problems with his knee and is asking about a work note for his employer stating he still has not been releaseed

## 2022-11-10 ENCOUNTER — OFFICE VISIT (OUTPATIENT)
Dept: ORTHOPEDIC SURGERY | Age: 52
End: 2022-11-10
Payer: COMMERCIAL

## 2022-11-10 DIAGNOSIS — M17.11 PRIMARY OSTEOARTHRITIS OF RIGHT KNEE: Primary | ICD-10-CM

## 2022-11-10 PROCEDURE — 99213 OFFICE O/P EST LOW 20 MIN: CPT | Performed by: STUDENT IN AN ORGANIZED HEALTH CARE EDUCATION/TRAINING PROGRAM

## 2022-11-10 NOTE — LETTER
[id number]  [address] [address]  Phone: [phone]  Fax: [phone]    Roberto Cronin MD        November 10, 2022     Patient: Huyen Levy   YOB: 1970   Date of Visit: 11/10/2022       To Whom It May Concern:     Luc Canas is under our care for a chronic condition, arthritis of the right knee, management of which may be prolonged. He is currently undergoing therapies for the management of this condition. He may exhibit chronic pain with walking, but has no specific limitations. If you have any questions or concerns, please don't hesitate to call.      Sincerely,        Roberto Cronin MD

## 2022-11-10 NOTE — PROGRESS NOTES
Name: Tati Allen  YOB: 1970  Gender: male  MRN: [id number]  Date of Encounter:  11/10/2022       CHIEF COMPLAINT:     Chief Complaint   Patient presents with    Follow-up     Discussion of right knee        SUBJECTIVE/OBJECTIVE:      HPI:    Patient is a 47 y.o.  male who presents today for a follow up evaluation of his right knee. Working diagnosis is: The encounter diagnosis was Primary osteoarthritis of right knee. LOV: 10/27/2022     He had little benefit from the steroid injection of the right knee and he is frustrated. He felt like things were not explained to him and wants to know the next steps in management of his knee pain. He did have some improvement of swelling. He reports increased locking of the knee. Describes it getting stuck. PAST HISTORY:   Past medical, surgical, family, social history and allergies reviewed by me. Unchanged from prior visit. REVIEW OF SYSTEMS:   As noted in HPI. PHYSICAL EXAMINATION:     Gen: Well-developed, no acute distress   HEENT: NC/AT, EOMI   Neck: Trachea midline, normal ROM   CV: Regular rhythm by palpation of distal pulse, normal capillary refill   Pulm: No respiratory distress, no stridor   Psychiatric: Well oriented, normal mood and affect. Skin: No rashes, lesions or ulcers, normal temperature, turgor, and texture on uninvolved extremity. ORTHO EXAM:     Right knee: Inspection: No edema, No erythema  Palpation: Effusion  mild; Crepitus Positive  ROM: 110 flexion, 0 extension   Tenderness: Medial joint line, Lateral joint line  Provocative testing: not tested  Strength: Extensor mechanism intact  Sensation: intact to light touch   Capillary refill normal    Gait: Mildly antalgic      DIAGNOSTIC IMAGING:     I have reviewed prior imaging studies. ASSESSMENT/PLAN:   1. Primary osteoarthritis of right knee         We had a long discussion regarding his diagnosis.  We had previously on our last visit discussed management options for OA. I advised him that the Xrays we have available are supine and we cannot adequately report his knee when standing, but that there are signs of medial > lateral OA. Also, with his history of prior arthroscopy, he likely has meniscal degeneration. He may have a loose body with reported locking. I advised we obtain MRI to evaluate this. Regarding OA management, he is not severe enough to warrant surgery yet and he does not want surgery. We discussed other options including visco, which his insurance does not cover. He would like the out of pocket pricing which we will obtain for him. He agreed to a PT referral which I advised. He has a brace. He gets benefit from topical medication but not oral. He has pain medicine at home. He does not want a pain management referral. He was advised that he has no limitations in work but that his condition can be debilitating due to pain and if he wants to work he should consider another job. He will return after MRI and for possible visco injection. Orders:   Orders Placed This Encounter   Procedures    MRI KNEE RIGHT WO CONTRAST     Standing Status:   Future     Standing Expiration Date:   11/10/2023     Order Specific Question:   Reason for exam:     Answer:   loose body     Order Specific Question:   What is the sedation requirement? Answer:   None    Ambulatory referral to Physical Therapy     Referral Priority:   Routine     Referral Type:   Eval and Treat     Referral Reason:   Patient Preference     Number of Visits Requested:   1        Follow Up:   Return in 3 weeks (on 12/1/2022) for After MRI. The patient expressed understanding and agreed with the plan. Molina Ayon MD   [facility]     This document was created using voice recognition software so frequent mistakes are possible.  For any concerns about the wording of this document, please contact its creator for further clarification.

## 2022-11-11 DIAGNOSIS — M17.11 PRIMARY OSTEOARTHRITIS OF RIGHT KNEE: Primary | ICD-10-CM

## 2022-11-16 ENCOUNTER — OFFICE VISIT (OUTPATIENT)
Dept: ORTHOPEDIC SURGERY | Age: 52
End: 2022-11-16
Payer: COMMERCIAL

## 2022-11-16 DIAGNOSIS — M94.28 CHONDROMALACIA OF TROCHLEA: ICD-10-CM

## 2022-11-16 DIAGNOSIS — S82.114D CLOSED NONDISPLACED FRACTURE OF SPINE OF RIGHT TIBIA WITH ROUTINE HEALING, SUBSEQUENT ENCOUNTER: Primary | ICD-10-CM

## 2022-11-16 DIAGNOSIS — M23.8X1 CHONDRAL DEFECT OF RIGHT PATELLA: ICD-10-CM

## 2022-11-16 PROCEDURE — 99212 OFFICE O/P EST SF 10 MIN: CPT | Performed by: STUDENT IN AN ORGANIZED HEALTH CARE EDUCATION/TRAINING PROGRAM

## 2022-11-16 NOTE — PROGRESS NOTES
Name: Justyn Shaffer  YOB: 1970  Gender: male  MRN: [id number]      CC: Follow-up (MRI results right knee)       HPI: Justyn Shaffer is a 47 y.o. male who returns for follow up and MRI results of the right knee. His pain is actually improving somewhat. He quit his job at the [employer] and is seeking other work. Physical Examination:  General: no acute distress, well appearing  Lungs: no respiratory distress or stridor   CV: regular rhythm by pulse, normal capillary refill    Right knee:   No effusion, erythema   Flexion 110, extension 0  Gait normal    Imaging:     I independently interpreted the MRI I ordered of the right knee    Findings:   Previous medial meniscectomy. No visualized meniscus tear to lateral or medial meniscus. ACL and PCL are intact. MCL, LCL intact. Hamstring, quadricep, patellar tendons without injury. There is a mild joint effusion. At the insertion of the ACL intracondylar region of the tibia, there is osseous edema present and what appears to be a small fracture line. This was not commented on in radiologic report. This is seen on 3 planes of imaging. There is a small chondral partial thickness defect of the patella with trochlear chondral thinning. The articular cartilage of tibia and joint surface of femur appear normal.     Impression:   Intracondylar fracture with osseous edema. Patellofemoral chondral fissuring and thinning. Joint effusion. Assessment:   1. Closed nondisplaced fracture of spine of right tibia with routine healing, subsequent encounter    2. Chondral defect of right patella    3. Chondromalacia of trochlea        Plan:     MRI suspicious for a nondisplaced intracondylar fracture. This may be the root of his pain. He does have some mild chondral defect of patella and trochlear chondromalacia, which would cause chronic pain. I advised we continue limited weight bearing.  He can walk for ADLs, but limit additional weight bearing. NWB exercises given. We can discuss visco injection if not improved at next visit, as this may be helpful for his PF chondral issues. Return in about 3 weeks (around 12/7/2022).      Saundra Perez MD   [facility]

## 2022-11-17 ENCOUNTER — TELEPHONE (OUTPATIENT)
Dept: ORTHOPEDIC SURGERY | Age: 52
End: 2022-11-17

## 2022-12-08 ENCOUNTER — OFFICE VISIT (OUTPATIENT)
Dept: ORTHOPEDIC SURGERY | Age: 52
End: 2022-12-08
Payer: COMMERCIAL

## 2022-12-08 DIAGNOSIS — M94.28 CHONDROMALACIA OF TROCHLEA: ICD-10-CM

## 2022-12-08 DIAGNOSIS — S82.114D CLOSED NONDISPLACED FRACTURE OF SPINE OF RIGHT TIBIA WITH ROUTINE HEALING, SUBSEQUENT ENCOUNTER: Primary | ICD-10-CM

## 2022-12-08 PROCEDURE — 99213 OFFICE O/P EST LOW 20 MIN: CPT | Performed by: STUDENT IN AN ORGANIZED HEALTH CARE EDUCATION/TRAINING PROGRAM

## 2022-12-08 NOTE — PROGRESS NOTES
Name: Huyen Levy  YOB: 1970  Gender: male  MRN: [id number]  Date of Encounter:  12/8/2022       CHIEF COMPLAINT:     Chief Complaint   Patient presents with    Follow-up     Right knee        SUBJECTIVE/OBJECTIVE:      HPI:    Patient is a 47 y.o. pleasant male who presents today for a follow up evaluation of his right knee. Working diagnosis is: The primary encounter diagnosis was Closed nondisplaced fracture of spine of right tibia with routine healing, subsequent encounter. A diagnosis of Chondromalacia of trochlea was also pertinent to this visit. LOV: 11/16/2022     He reports doing better. He still has pain, mostly to the anterior knee, and painful popping. His swelling is much improved. He is wearing his brace. PAST HISTORY:   Past medical, surgical, family, social history and allergies reviewed by me. Unchanged from prior visit. REVIEW OF SYSTEMS:   As noted in HPI. PHYSICAL EXAMINATION:     Gen: Well-developed, no acute distress   HEENT: NC/AT, EOMI   Neck: Trachea midline, normal ROM   CV: Regular rhythm by palpation of distal pulse, normal capillary refill   Pulm: No respiratory distress, no stridor   Psychiatric: Well oriented, normal mood and affect. Skin: No rashes, lesions or ulcers, normal temperature, turgor, and texture on uninvolved extremity. ORTHO EXAM:     Right knee: Inspection: no edema or erythema  Palpation: Effusion  none; Crepitus Positive, Patellar mobility normal  ROM: 140 flexion, 0 extension. Pain with terminal extension and flexion  Tenderness: No tenderness   Provocative testing: not tested today  Strength: Extensor mechanism intact  Sensation: intact to light touch   Capillary refill normal    Gait: mildly antalgic    DIAGNOSTIC IMAGING:     I have reviewed prior imaging studies. ASSESSMENT/PLAN:   1. Closed nondisplaced fracture of spine of right tibia with routine healing, subsequent encounter    2.  Chondromalacia of crysa         We reviewed what was seen on MRI at last visit. His pain is improving. I think majority of his pain is from patellofemoral arthritis at this point, but the edema and appearance of fracture of tibia on MRI needs more time to heal, he's at 6ish weeks out from the onset of pain. I advised since hes doing fairly well we give him more time to heal from this, then we can start Euflexxa series in Jan. He agrees with plan. All questions answered. Continue NSAIDs as needed, continue brace use. Continue activity modification. Orders:   No orders of the defined types were placed in this encounter. Follow Up:   Return in about 4 weeks (around 1/5/2023). The patient expressed understanding and agreed with the plan. Molina Ayon MD   [facility]     This document was created using voice recognition software so frequent mistakes are possible. For any concerns about the wording of this document, please contact its creator for further clarification.

## 2023-01-05 ENCOUNTER — OFFICE VISIT (OUTPATIENT)
Dept: ORTHOPEDIC SURGERY | Age: 53
End: 2023-01-05

## 2023-01-05 DIAGNOSIS — S82.114D CLOSED NONDISPLACED FRACTURE OF SPINE OF RIGHT TIBIA WITH ROUTINE HEALING, SUBSEQUENT ENCOUNTER: ICD-10-CM

## 2023-01-05 DIAGNOSIS — M17.11 PATELLOFEMORAL ARTHRITIS OF RIGHT KNEE: Primary | ICD-10-CM

## 2023-01-05 RX ORDER — HYALURONATE SODIUM 10 MG/ML
20 SYRINGE (ML) INTRAARTICULAR ONCE
Status: COMPLETED | OUTPATIENT
Start: 2023-01-05 | End: 2023-01-05

## 2023-01-05 RX ADMIN — Medication 20 MG: at 08:44

## 2023-01-05 NOTE — PROGRESS NOTES
Name: Joce Bermudez  YOB: 1970  Gender: male  MRN: [id number]  Date of Encounter:  1/5/2023       CHIEF COMPLAINT:     Chief Complaint   Patient presents with    Follow-up     Right knee        SUBJECTIVE/OBJECTIVE:      HPI:    Patient is a 47 y.o. pleasant male who presents today for a return evaluation of his right knee. Working diagnosis: patellofemoral arthritis, fracture of tibial spine    Overall pain is improving day to day. He has been limiting his activity but doing some of the home exercises provided. He has swelling from time to time but not as often. He wears his brace. Pain is most notable taking stairs. PAST HISTORY:   Past medical, surgical, family, social history and allergies reviewed by me. Unchanged from prior visit. REVIEW OF SYSTEMS:   As noted in HPI. PHYSICAL EXAMINATION:     Gen: Well-developed, no acute distress   HEENT: NC/AT, EOMI   Neck: Trachea midline, normal ROM   CV: Regular rhythm by palpation of distal pulse, normal capillary refill   Pulm: No respiratory distress, no stridor   Psychiatric: Well oriented, normal mood and affect. Skin: No rashes, lesions or ulcers, normal temperature, turgor, and texture on uninvolved extremity. ORTHO EXAM:    Right knee: Inspection: No ecchymosis, No erythema  Palpation: Effusion moderate; Crepitus Positive, Patellar mobility normal  ROM: 130 flexion, 0 extension   Tenderness: Medial patellar facet, Lateral patellar facet  Provocative testing: (-) Clarissa lateral, Clarissa medial  Strength: Extensor mechanism intact  Sensation: intact to light touch   Capillary refill normal    Gait: Normal     DIAGNOSTIC IMAGING:     I have reviewed prior imaging studies. ASSESSMENT/PLAN:   1. Patellofemoral arthritis of right knee    2. Closed nondisplaced fracture of spine of right tibia with routine healing, subsequent encounter         Overall improving.  I think his pain is more due to his arthritis at this time. Similar to previous discussions, we discussed viscosupplementation today for his patellofemoral arthritis and he wished to proceed with that injection. I advised he can begin to advance normal activity, starting with walking, recumbent bike, and continue his lower extremity strengthening. PT prescription was recommended. He would like to think about it. Continue oral / topical NSAIDs as needed for pain. He was told he may wean from his brace as he tolerates it. Orders / medications today:   Orders Placed This Encounter   Procedures    ARTHROCENTESIS ASPIR&/INJ MAJOR JT/BURSA W/US      Follow up: Return in about 1 week (around 1/12/2023). The patient expressed understanding and agreed with the plan. Tejas Drummond MD   [facility]     This document was created using voice recognition software so frequent mistakes are possible. For any concerns about the wording of this document, please contact its creator for further clarification. Right Knee Injection - US guided      An injection of Euflexxa viscosupplement was discussed today and is indicated for patients pain and condition today. Risks including infection, bleeding, nerve damage, and pain at the site of injection were discussed at length with the patient. Benefits including pain relief were also discussed with the patient. Patient verbalizes understanding of these and agrees to procedure. he consents to this procedure. Time-out was conducted with all members of the care team.     The patient was placed in a supine position with the right slightly flexed to 30 degrees. A superior lateral approach was utilized for this injection procedure. The ultrasound probe was use to localize the joint capsule. The site of the injection was then cleansed chlorhexidine. A sterile gel was then placed over the area and the probe was repositioned to reveal the intraarticular space.  Following this a vapo coolant spray was utilized to provide local skin anesthesia. A  Euflexxa injection was delivered through a 22 gauge, 1.5\" needle into the joint capsule. The site was again cleansed with an alcohol swab. The patient tolerated this procedure well with no adverse events. There was some notable pain relief reported by the patient prior to leaving the office today. The patient was counseled not to submerge the site for 24 hours, not to perform strenuous activity for the next five days, and if notes any signs or symptoms consistent with joint infection or allergic reaction to go to a local emergency room. The patient was observed for 15 minutes postprocedure and was allowed to be discharged from clinic in their usual state of health. Ultrasound use was deemed to be medically necessary to ensure appropriate placement of the injectate. Images were saved to PACS system.

## 2023-01-12 ENCOUNTER — OFFICE VISIT (OUTPATIENT)
Dept: ORTHOPEDIC SURGERY | Age: 53
End: 2023-01-12

## 2023-01-12 DIAGNOSIS — M17.11 PATELLOFEMORAL ARTHRITIS OF RIGHT KNEE: Primary | ICD-10-CM

## 2023-01-12 RX ORDER — HYALURONATE SODIUM 10 MG/ML
20 SYRINGE (ML) INTRAARTICULAR ONCE
Status: COMPLETED | OUTPATIENT
Start: 2023-01-12 | End: 2023-01-12

## 2023-01-12 RX ADMIN — Medication 20 MG: at 08:36

## 2023-01-12 NOTE — PROGRESS NOTES
Name: Matt Stafford  YOB: 1970  Gender: male  MRN: [id number]  Date of Encounter:  1/12/2023       CHIEF COMPLAINT:     Chief Complaint   Patient presents with    Injections     Right knee Euflexxa #2        SUBJECTIVE/OBJECTIVE:      HPI:    Patient is a 47 y.o. pleasant male who presents today for a Euflexxa injection of the right knee. 1. Patellofemoral arthritis of right knee        Right Knee Injection - US guided      An injection of Euflexxa viscosupplement was discussed today and is indicated for patients pain and condition today. Risks including infection, bleeding, nerve damage, and pain at the site of injection were discussed at length with the patient. Benefits including pain relief were also discussed with the patient. Patient verbalizes understanding of these and agrees to procedure. he consents to this procedure. Time-out was conducted with all members of the care team.     The patient was placed in a supine position with the right slightly flexed to 30 degrees. A superior lateral approach was utilized for this injection procedure. The ultrasound probe was use to localize the joint capsule. The site of the injection was then cleansed chlorhexidine. A sterile gel was then placed over the area and the probe was repositioned to reveal the intraarticular space. Following this a vapo coolant spray was utilized to provide local skin anesthesia. A  Euflexxa injection was delivered through a 22 gauge, 1.5\" needle into the joint capsule. The site was again cleansed with an alcohol swab. The patient tolerated this procedure well with no adverse events. There was some notable pain relief reported by the patient prior to leaving the office today. The patient was counseled not to submerge the site for 24 hours, not to perform strenuous activity for the next five days, and if notes any signs or symptoms consistent with joint infection or allergic reaction to go to a local emergency room. The patient was observed for 15 minutes postprocedure and was allowed to be discharged from clinic in their usual state of health. Ultrasound use was deemed to be medically necessary to ensure appropriate placement of the injectate. Images were saved to PACS system. Return in about 1 week (around 1/19/2023) for viscosupplement injection.      Chico Macedo MD  [facility]

## 2023-01-19 ENCOUNTER — OFFICE VISIT (OUTPATIENT)
Dept: ORTHOPEDIC SURGERY | Age: 53
End: 2023-01-19

## 2023-01-19 DIAGNOSIS — M17.11 PATELLOFEMORAL ARTHRITIS OF RIGHT KNEE: Primary | ICD-10-CM

## 2023-01-19 RX ORDER — HYALURONATE SODIUM 10 MG/ML
20 SYRINGE (ML) INTRAARTICULAR ONCE
Status: COMPLETED | OUTPATIENT
Start: 2023-01-19 | End: 2023-01-19

## 2023-01-19 RX ADMIN — Medication 20 MG: at 08:39

## 2023-01-19 NOTE — PROGRESS NOTES
Name: Vineet Morgan  YOB: 1970  Gender: male  MRN: [id number]  Date of Encounter:  1/19/2023       CHIEF COMPLAINT:     Chief Complaint   Patient presents with    Knee Pain     Euflexxa #3          SUBJECTIVE/OBJECTIVE:      HPI:    Patient is a 47 y.o. pleasant male who presents today for a Euflexxa injection of the right knee, #3.    1. Patellofemoral arthritis of right knee        Right Knee Injection - US guided      An injection of Euflexxa viscosupplement was discussed today and is indicated for patients pain and condition today. Risks including infection, bleeding, nerve damage, and pain at the site of injection were discussed at length with the patient. Benefits including pain relief were also discussed with the patient. Patient verbalizes understanding of these and agrees to procedure. he consents to this procedure. Time-out was conducted with all members of the care team.     The patient was placed in a supine position with the right slightly flexed to 30 degrees. A superior lateral approach was utilized for this injection procedure. The ultrasound probe was use to localize the joint capsule. The site of the injection was then cleansed chlorhexidine. A sterile gel was then placed over the area and the probe was repositioned to reveal the intraarticular space. Following this a vapo coolant spray was utilized to provide local skin anesthesia. A  Euflexxa injection was delivered through a 22 gauge, 1.5\" needle into the joint capsule. The site was again cleansed with an alcohol swab. The patient tolerated this procedure well with no adverse events. There was some notable pain relief reported by the patient prior to leaving the office today. The patient was counseled not to submerge the site for 24 hours, not to perform strenuous activity for the next five days, and if notes any signs or symptoms consistent with joint infection or allergic reaction to go to a local emergency room.  The patient was observed for 15 minutes postprocedure and was allowed to be discharged from clinic in their usual state of health. Ultrasound use was deemed to be medically necessary to ensure appropriate placement of the injectate. Images were saved to PACS system. We discussed ongoing care of the knee including NSAIDs, icing, bracing, and consideration of steroid injection, repeat visco in 6 months. He is comfortable with care plan. Return in about 3 months (around 4/19/2023).      Yesenia Bowers MD  [facility]

## 2023-02-10 ENCOUNTER — TELEMEDICINE (OUTPATIENT)
Dept: FAMILY MEDICINE CLINIC | Facility: CLINIC | Age: 53
End: 2023-02-10
Payer: COMMERCIAL

## 2023-02-10 DIAGNOSIS — F17.210 TOBACCO DEPENDENCE DUE TO CIGARETTES: ICD-10-CM

## 2023-02-10 DIAGNOSIS — F10.10 ALCOHOL USE DISORDER, MILD, ABUSE: ICD-10-CM

## 2023-02-10 DIAGNOSIS — E78.5 HYPERLIPIDEMIA, UNSPECIFIED HYPERLIPIDEMIA TYPE: ICD-10-CM

## 2023-02-10 DIAGNOSIS — M15.9 OSTEOARTHRITIS OF MULTIPLE JOINTS, UNSPECIFIED OSTEOARTHRITIS TYPE: ICD-10-CM

## 2023-02-10 DIAGNOSIS — I10 HYPERTENSION, UNSPECIFIED TYPE: Primary | ICD-10-CM

## 2023-02-10 PROCEDURE — 99214 OFFICE O/P EST MOD 30 MIN: CPT | Performed by: FAMILY MEDICINE

## 2023-02-10 RX ORDER — LOSARTAN POTASSIUM 100 MG/1
100 TABLET ORAL DAILY
Qty: 90 TABLET | Refills: 1 | Status: SHIPPED | OUTPATIENT
Start: 2023-02-10

## 2023-02-10 RX ORDER — FOLIC ACID 1 MG/1
1 TABLET ORAL DAILY
Qty: 90 TABLET | Refills: 1 | Status: SHIPPED | OUTPATIENT
Start: 2023-02-10

## 2023-02-10 RX ORDER — ROSUVASTATIN CALCIUM 10 MG/1
10 TABLET, COATED ORAL EVERY EVENING
Qty: 90 TABLET | Refills: 1 | Status: SHIPPED | OUTPATIENT
Start: 2023-02-10

## 2023-02-10 RX ORDER — DICLOFENAC SODIUM 75 MG/1
75 TABLET, DELAYED RELEASE ORAL 2 TIMES DAILY PRN
Qty: 60 TABLET | Refills: 5 | Status: SHIPPED | OUTPATIENT
Start: 2023-02-10

## 2023-02-10 RX ORDER — LANOLIN ALCOHOL/MO/W.PET/CERES
100 CREAM (GRAM) TOPICAL DAILY
Qty: 90 TABLET | Refills: 1 | Status: SHIPPED | OUTPATIENT
Start: 2023-02-10

## 2023-02-10 NOTE — PATIENT INSTRUCTIONS

## 2023-02-10 NOTE — Clinical Note
Forgot to mention this; when he comes for his labs please also get an MA to do his vitals; this should be standard for any patient coming for labs related to a virtual visit; minerva

## 2023-02-10 NOTE — Clinical Note
Pt did not get his pre-visit labs done for today's virtual visit as intended (he said no one mentioned that when scheduling him). ..  Please call him to schedule a lab only visit sometime next week as well as his next scheduled visit in 6 months from now; minerva

## 2023-02-10 NOTE — PROGRESS NOTES
[facility]  [address] [place] [address], [address] [place] [address]  Phone: [phone]  Fax: [phone]  Email: [email or web address]      Encounter 5 [facility]; Established patient 47 y.o.male; seen 2/10/2023 for: Cholesterol Problem and Hypertension      Assessment & Plan    1. Hypertension, unspecified type  -     losartan (COZAAR) 100 MG tablet; Take 1 tablet by mouth daily, Disp-90 tablet, R-1Normal  2. Hyperlipidemia, unspecified hyperlipidemia type  -     rosuvastatin (CRESTOR) 10 MG tablet; Take 1 tablet by mouth every evening, Disp-90 tablet, R-1Normal  3. Osteoarthritis of multiple joints, unspecified osteoarthritis type  -     diclofenac (VOLTAREN) 75 MG EC tablet; Take 1 tablet by mouth 2 times daily as needed for Pain, Disp-60 tablet, R-5Normal  4. Alcohol use disorder, mild, abuse  -     folic acid (FOLVITE) 1 MG tablet; Take 1 tablet by mouth daily, Disp-90 tablet, R-1Normal  -     thiamine 100 MG tablet; Take 1 tablet by mouth daily, Disp-90 tablet, R-1Normal  5. Tobacco dependence due to cigarettes    Problem and/or Symptoms are currently stable and/or improving on current treatment plan. Will continue and have patient follow up as directed. Pertinent labs pending & pertinent meds refilled X 6 M      Check Out Instructions  Return in about 6 months (around 8/10/2023) for Physical, Previsit Labs. Subjective & Objective    HPI  Patient states that chronic health problems are stable on current regimens and has no acute concerns today except as mentioned. Review of Systems     Physical Exam  No flowsheet data found. BP Readings from Last 3 Encounters:   10/21/22 (!) 154/84   08/12/22 (!) 155/86   03/11/22 137/79     Wt Readings from Last 3 Encounters:   10/21/22 143 lb (64.9 kg)   08/12/22 142 lb 3.2 oz (64.5 kg)   11/01/21 146 lb 12.8 oz (66.6 kg)     There is no height or weight on file to calculate BMI.      PHQ-9  10/21/2022   Little interest or pleasure in doing things 0   Little interest or pleasure in doing things -   Feeling down, depressed, or hopeless 0   PHQ-2 Score 0   Total Score PHQ 2 -   PHQ-9 Total Score 0        We discussed the typical prognosis and potential complications of the concern(s), including treatment options. Medication risks, benefits, costs, interactions, and alternatives were discussed as appropriate. I advised him to contact the office if his condition worsens or fails to improve as anticipated. He expressed understanding with the discussion and plan of care. Grazyna Browning, was evaluated through a synchronous (real-time) audio and/or video encounter. The patient (or guardian if applicable) is aware that this is a billable service, which includes applicable co-pays. This Virtual Visit was conducted with patient's (and/or legal guardian's) consent. The visit was conducted pursuant to the emergency declaration under the [address] [place] [address], [address] [place] [address] authority and the [employer] General Act. Patient identification was verified, and a caregiver was present when appropriate. The patient was located at Home: [address] [address]. Provider was located at [facility] (Appt Dept): [id number] ,  [place] [address]. An electronic signature was used to authenticate this note.   -- Elfreda Meigs, MD

## 2023-02-17 ENCOUNTER — NURSE ONLY (OUTPATIENT)
Dept: FAMILY MEDICINE CLINIC | Facility: CLINIC | Age: 53
End: 2023-02-17

## 2023-02-17 DIAGNOSIS — I10 HYPERTENSION, UNSPECIFIED TYPE: ICD-10-CM

## 2023-02-17 DIAGNOSIS — E78.5 HYPERLIPIDEMIA, UNSPECIFIED HYPERLIPIDEMIA TYPE: ICD-10-CM

## 2023-02-17 LAB
ALBUMIN SERPL-MCNC: 3.9 G/DL (ref 3.5–5)
ALBUMIN/GLOB SERPL: 1.1 (ref 0.4–1.6)
ALP SERPL-CCNC: 132 U/L (ref 50–136)
ALT SERPL-CCNC: 137 U/L (ref 12–65)
ANION GAP SERPL CALC-SCNC: 8 MMOL/L (ref 2–11)
AST SERPL-CCNC: 206 U/L (ref 15–37)
BILIRUB SERPL-MCNC: 0.6 MG/DL (ref 0.2–1.1)
BUN SERPL-MCNC: 8 MG/DL (ref 6–23)
CALCIUM SERPL-MCNC: 9 MG/DL (ref 8.3–10.4)
CHLORIDE SERPL-SCNC: 101 MMOL/L (ref 101–110)
CHOLEST SERPL-MCNC: 197 MG/DL
CO2 SERPL-SCNC: 26 MMOL/L (ref 21–32)
CREAT SERPL-MCNC: 0.7 MG/DL (ref 0.8–1.5)
GLOBULIN SER CALC-MCNC: 3.4 G/DL (ref 2.8–4.5)
GLUCOSE SERPL-MCNC: 81 MG/DL (ref 65–100)
HDLC SERPL-MCNC: 105 MG/DL (ref 40–60)
HDLC SERPL: 1.9
LDLC SERPL CALC-MCNC: 80.8 MG/DL
POTASSIUM SERPL-SCNC: 4.6 MMOL/L (ref 3.5–5.1)
PROT SERPL-MCNC: 7.3 G/DL (ref 6.3–8.2)
SODIUM SERPL-SCNC: 135 MMOL/L (ref 133–143)
TRIGL SERPL-MCNC: 56 MG/DL (ref 35–150)
VLDLC SERPL CALC-MCNC: 11.2 MG/DL (ref 6–23)

## 2023-04-14 PROBLEM — F41.1 GAD (GENERALIZED ANXIETY DISORDER): Status: ACTIVE | Noted: 2023-04-14

## 2023-04-14 PROBLEM — F32.9 MDD (MAJOR DEPRESSIVE DISORDER): Status: ACTIVE | Noted: 2023-04-14

## 2023-04-17 ENCOUNTER — NURSE ONLY (OUTPATIENT)
Dept: FAMILY MEDICINE CLINIC | Facility: CLINIC | Age: 53
End: 2023-04-17

## 2023-04-17 VITALS — SYSTOLIC BLOOD PRESSURE: 150 MMHG | DIASTOLIC BLOOD PRESSURE: 86 MMHG

## 2023-04-17 DIAGNOSIS — Z01.30 BLOOD PRESSURE CHECK: Primary | ICD-10-CM

## 2023-04-19 ENCOUNTER — TELEPHONE (OUTPATIENT)
Dept: FAMILY MEDICINE CLINIC | Facility: CLINIC | Age: 53
End: 2023-04-19

## 2023-04-19 NOTE — TELEPHONE ENCOUNTER
173/92 was bp reading at 8am this morning. Patient is concerned with elevated blood pressure and didn't function well yesterday, he thinks it has to do the with lexapro and does not want to continue with that, as he feels it is elevating his blood pressure. He wants to know how long the lexapro will stay in his system.

## 2023-04-20 ENCOUNTER — NURSE ONLY (OUTPATIENT)
Dept: FAMILY MEDICINE CLINIC | Facility: CLINIC | Age: 53
End: 2023-04-20

## 2023-04-20 VITALS — DIASTOLIC BLOOD PRESSURE: 82 MMHG | SYSTOLIC BLOOD PRESSURE: 144 MMHG

## 2023-04-20 DIAGNOSIS — Z01.30 BLOOD PRESSURE CHECK: Primary | ICD-10-CM

## 2023-04-21 ENCOUNTER — NURSE ONLY (OUTPATIENT)
Dept: FAMILY MEDICINE CLINIC | Facility: CLINIC | Age: 53
End: 2023-04-21

## 2023-04-21 VITALS — SYSTOLIC BLOOD PRESSURE: 139 MMHG | DIASTOLIC BLOOD PRESSURE: 86 MMHG

## 2023-04-25 ENCOUNTER — OFFICE VISIT (OUTPATIENT)
Dept: ORTHOPEDIC SURGERY | Age: 53
End: 2023-04-25
Payer: COMMERCIAL

## 2023-04-25 ENCOUNTER — TELEPHONE (OUTPATIENT)
Dept: ORTHOPEDIC SURGERY | Age: 53
End: 2023-04-25

## 2023-04-25 ENCOUNTER — TELEPHONE (OUTPATIENT)
Dept: FAMILY MEDICINE CLINIC | Facility: CLINIC | Age: 53
End: 2023-04-25

## 2023-04-25 DIAGNOSIS — M17.11 PATELLOFEMORAL ARTHRITIS OF RIGHT KNEE: Primary | ICD-10-CM

## 2023-04-25 PROCEDURE — 99213 OFFICE O/P EST LOW 20 MIN: CPT | Performed by: STUDENT IN AN ORGANIZED HEALTH CARE EDUCATION/TRAINING PROGRAM

## 2023-04-25 NOTE — PROGRESS NOTES
Name: Charity Santamaria  YOB: 1970  Gender: male  MRN: [id number]  Date of Encounter:  4/25/2023       CHIEF COMPLAINT:     Chief Complaint   Patient presents with    Follow-up     Right knee        SUBJECTIVE/OBJECTIVE:      HPI:    Patient is a 47 y.o. pleasant male who presents today for a return evaluation of his right knee. Working diagnosis:   1. Patellofemoral arthritis of right knee       LOV: 1/19/2023     10/21/2022: Initial evaluation for several weeks of right knee pain and swelling, limited weightbearing, previous partial meniscectomy, steroid injection performed  Subsequent rechecks reported continued pain and swelling, MRI showed questionable tibial fracture, no acute meniscus tear, trochlear arthritis  1/19/2023: Completed Euflexxa injection series  4/25/23: Reports around 65% improvement in the pain. Does not have constant pain, but occasional moments of knee popping and pain. He has not resumed jogging, but is able to do outdoor activities without much issue. Swelling has been controlled. Overall is happy with results of Euflexxa series. PAST HISTORY:   Past medical, surgical, family, social history and allergies reviewed by me. Unchanged from prior visit. REVIEW OF SYSTEMS:   As noted in HPI. PHYSICAL EXAMINATION:     Gen: Well-developed, no acute distress   HEENT: NC/AT, EOMI   Neck: Trachea midline, normal ROM   CV: Regular rhythm by palpation of distal pulse, normal capillary refill   Pulm: No respiratory distress, no stridor   Psychiatric: Well oriented, normal mood and affect. Skin: No rashes, lesions or ulcers, normal temperature, turgor, and texture on uninvolved extremity. ORTHO EXAM:    Right knee:  No effusion. Flexion 140, extension 0. No tenderness midline. Negative Clarissa. Mild anterior crepitus. Normal gait. DIAGNOSTIC IMAGING:     I have reviewed prior imaging studies. ASSESSMENT/PLAN:   1.  Patellofemoral arthritis of right

## 2023-04-26 ENCOUNTER — TELEMEDICINE (OUTPATIENT)
Dept: FAMILY MEDICINE CLINIC | Facility: CLINIC | Age: 53
End: 2023-04-26
Payer: COMMERCIAL

## 2023-04-26 DIAGNOSIS — R25.1 TREMOR OF BOTH HANDS: ICD-10-CM

## 2023-04-26 DIAGNOSIS — T50.905A MEDICATION ADVERSE EFFECT, INITIAL ENCOUNTER: Primary | ICD-10-CM

## 2023-04-26 PROCEDURE — 99214 OFFICE O/P EST MOD 30 MIN: CPT | Performed by: FAMILY MEDICINE

## 2023-04-26 NOTE — PROGRESS NOTES
C[place]ell  [address] [place] [address], [address] [place] [address]  Phone: [phone]  Fax: [phone]  Email: [email or web address]      Encounter 5 [facility]; Established patient 47 y.o.male; seen 4/26/2023 for: Other (Return to work letter (jittery from allergy medications))      Assessment & Plan    1. Medication adverse effect, initial encounter  2. Tremor of both hands    Discussed with pt that decongestants can sometimes cause Sx like insomnia, tremors, palpitations, etc. Advised him to avoid this type of medication in the future. Pt V/U. Provided note to pt for his employer regarding this issue & that there should not be any problem with him working his typical job responsibilities. Check Out Instructions  Return if symptoms worsen or fail to improve. Subjective & Objective    HPI  Pt seen today for concern of having some issues with tremor while at work. Primarily affecting his hands & made it difficult to hand write. Pt states this was a response to taking allergy medication; as soon as he stopped the allergy meds the Sx went away completely. Specifically pt thinks that it was phenylephrine that caused the issue; as soon as he stopped this the Sx resolved within 24 hours. Pt states that since the tremors occurred while he was at work; [facility] is requiring him to have a letter allowing him to come back to work & that the Sx was related to a medication that he is no longer taking.      Review of Systems     Physical Exam  Patient-Reported Vitals 4/26/2023   Patient-Reported Weight 155   Patient-Reported Height 5'9   Patient-Reported Systolic 089   Patient-Reported Diastolic 80   Patient-Reported Pulse 80   Patient-Reported Temperature 96.3       BP Readings from Last 3 Encounters:   04/21/23 139/86   04/20/23 (!) 144/82   04/17/23 (!) 150/86     Wt Readings from Last 3 Encounters:   04/14/23 153 lb 12.8 oz (69.8 kg)   10/21/22 143 lb (64.9 kg)   08/12/22 142 lb 3.2 oz

## 2023-07-03 ENCOUNTER — OFFICE VISIT (OUTPATIENT)
Dept: FAMILY MEDICINE CLINIC | Facility: CLINIC | Age: 53
End: 2023-07-03
Payer: COMMERCIAL

## 2023-07-03 VITALS
HEART RATE: 84 BPM | HEIGHT: 67 IN | DIASTOLIC BLOOD PRESSURE: 75 MMHG | WEIGHT: 148.4 LBS | TEMPERATURE: 97.8 F | SYSTOLIC BLOOD PRESSURE: 139 MMHG | BODY MASS INDEX: 23.29 KG/M2

## 2023-07-03 DIAGNOSIS — F10.90 ALCOHOL USE DISORDER: Primary | ICD-10-CM

## 2023-07-03 DIAGNOSIS — I10 HYPERTENSION, UNSPECIFIED TYPE: ICD-10-CM

## 2023-07-03 DIAGNOSIS — E78.5 HYPERLIPIDEMIA, UNSPECIFIED HYPERLIPIDEMIA TYPE: ICD-10-CM

## 2023-07-03 DIAGNOSIS — G47.00 INSOMNIA, UNSPECIFIED TYPE: ICD-10-CM

## 2023-07-03 DIAGNOSIS — Z12.11 COLON CANCER SCREENING: ICD-10-CM

## 2023-07-03 PROCEDURE — 3078F DIAST BP <80 MM HG: CPT | Performed by: FAMILY MEDICINE

## 2023-07-03 PROCEDURE — 99215 OFFICE O/P EST HI 40 MIN: CPT | Performed by: FAMILY MEDICINE

## 2023-07-03 PROCEDURE — 3075F SYST BP GE 130 - 139MM HG: CPT | Performed by: FAMILY MEDICINE

## 2023-07-03 RX ORDER — NALTREXONE HYDROCHLORIDE 50 MG/1
50 TABLET, FILM COATED ORAL
Qty: 30 TABLET | Refills: 2 | Status: SHIPPED | OUTPATIENT
Start: 2023-07-03

## 2023-07-03 RX ORDER — PROPRANOLOL HYDROCHLORIDE 10 MG/1
10 TABLET ORAL EVERY 8 HOURS PRN
COMMUNITY
End: 2023-07-03 | Stop reason: ALTCHOICE

## 2023-07-03 RX ORDER — QUETIAPINE FUMARATE 50 MG/1
TABLET, FILM COATED ORAL
COMMUNITY
Start: 2023-06-05 | End: 2023-07-03 | Stop reason: SDUPTHER

## 2023-07-03 RX ORDER — LOSARTAN POTASSIUM 50 MG/1
50 TABLET ORAL DAILY
Qty: 30 TABLET | Refills: 2 | Status: SHIPPED | OUTPATIENT
Start: 2023-07-03

## 2023-07-03 RX ORDER — FLUTICASONE PROPIONATE 50 MCG
SPRAY, SUSPENSION (ML) NASAL
COMMUNITY
Start: 2023-05-13

## 2023-07-03 RX ORDER — HYDROXYZINE PAMOATE 50 MG/1
50 CAPSULE ORAL
Qty: 30 CAPSULE | Refills: 2 | Status: SHIPPED | OUTPATIENT
Start: 2023-07-03

## 2023-07-03 RX ORDER — ROSUVASTATIN CALCIUM 10 MG/1
10 TABLET, COATED ORAL EVERY EVENING
Qty: 30 TABLET | Refills: 2 | Status: SHIPPED | OUTPATIENT
Start: 2023-07-03

## 2023-07-03 RX ORDER — OFLOXACIN 3 MG/ML
SOLUTION AURICULAR (OTIC)
COMMUNITY
Start: 2023-06-11

## 2023-07-03 RX ORDER — LOSARTAN POTASSIUM 50 MG/1
TABLET ORAL
COMMUNITY
Start: 2023-06-14 | End: 2023-07-03 | Stop reason: ALTCHOICE

## 2023-07-03 RX ORDER — NALTREXONE HYDROCHLORIDE 50 MG/1
TABLET, FILM COATED ORAL
COMMUNITY
Start: 2023-06-07 | End: 2023-07-03 | Stop reason: SDUPTHER

## 2023-07-03 RX ORDER — HYDROXYZINE PAMOATE 50 MG/1
CAPSULE ORAL
COMMUNITY
Start: 2023-05-30 | End: 2023-07-03 | Stop reason: SDUPTHER

## 2023-07-03 RX ORDER — ATORVASTATIN CALCIUM 40 MG/1
TABLET, FILM COATED ORAL
COMMUNITY
Start: 2023-06-05 | End: 2023-07-03 | Stop reason: ALTCHOICE

## 2023-07-03 RX ORDER — QUETIAPINE FUMARATE 50 MG/1
50 TABLET, FILM COATED ORAL
Qty: 30 TABLET | Refills: 2 | Status: SHIPPED | OUTPATIENT
Start: 2023-07-03

## 2023-07-03 ASSESSMENT — PATIENT HEALTH QUESTIONNAIRE - PHQ9: DEPRESSION UNABLE TO ASSESS: PT REFUSES

## 2023-07-03 NOTE — PROGRESS NOTES
[address], [facility] [address]  Phone: [phone]  Fax: [phone]  Email: [email or web address]      Encounter [address] [facility]; Established patient 47 y.o.male; seen 7/3/2023 for: Alcohol Problem and Discuss Medications      Assessment & Plan    1. Alcohol use disorder  -     naltrexone (DEPADE) 50 MG tablet; Take 1 tablet by mouth nightly, Disp-30 tablet, R-2Normal  2. Hypertension, unspecified type  -     losartan (COZAAR) 50 MG tablet; Take 1 tablet by mouth daily, Disp-30 tablet, R-2Normal  3. Hyperlipidemia, unspecified hyperlipidemia type  -     rosuvastatin (CRESTOR) 10 MG tablet; Take 1 tablet by mouth every evening, Disp-30 tablet, R-2Normal  4. Insomnia, unspecified type  -     QUEtiapine (SEROQUEL) 50 MG tablet; Take 1 tablet by mouth nightly, Disp-30 tablet, R-2Normal  -     hydrOXYzine pamoate (VISTARIL) 50 MG capsule; Take 1 capsule by mouth nightly, Disp-30 capsule, R-2Normal  5. Colon cancer screening  -     Cologuard (Fecal DNA Colorectal Cancer Screening)    Discussed w/pt all the recent medication changes & RF all meds today as needed. F/U at next scheduled visit in approx 6 wks. Check Out Instructions  Return for Next Scheduled. Subjective & Objective    HPI  Pt admitted to hospital for alcohol rehab from 5/11 to 6/22. Pt had been drinking 6-8 beers a day & his wife told him that he needed to \"get help or move out\". Pt was in agreement though as he felt he also needed help to quit drinking. Plan since leaving rehab is to be completely alcohol free. While admitted he states all his labs were good & he wasn't told he needed to f/u with PCP office to have anything in particular rechecked. Since leaving rehab pt feels well but it here today to primarily discuss medication changes that they did while he was in rehab. They had adjusted his HTN & HLD Tx regimens.      Review of Systems    Physical Exam  Vitals:    07/03/23 1457   BP:

## 2023-10-02 DIAGNOSIS — G47.00 INSOMNIA, UNSPECIFIED TYPE: ICD-10-CM

## 2023-10-02 DIAGNOSIS — F10.90 ALCOHOL USE DISORDER: ICD-10-CM

## 2023-10-02 RX ORDER — HYDROXYZINE PAMOATE 50 MG/1
50 CAPSULE ORAL
Qty: 30 CAPSULE | Refills: 0 | Status: SHIPPED | OUTPATIENT
Start: 2023-10-02

## 2023-10-02 RX ORDER — NALTREXONE HYDROCHLORIDE 50 MG/1
50 TABLET, FILM COATED ORAL
Qty: 30 TABLET | Refills: 0 | Status: SHIPPED | OUTPATIENT
Start: 2023-10-02

## 2023-10-02 NOTE — TELEPHONE ENCOUNTER
Pt is past due for a f/u visit as below:    Check Out Instructions  Return in about 6 months (around 8/10/2023) for Physical, Previsit Labs. Please advise pt to schedule visit ASAP; will provide him with a short term, 1 M RF of meds for now to allow him time to schedule f/u visit.

## 2023-10-18 DIAGNOSIS — I10 HYPERTENSION, UNSPECIFIED TYPE: ICD-10-CM

## 2023-10-18 RX ORDER — LOSARTAN POTASSIUM 50 MG/1
50 TABLET ORAL DAILY
Qty: 30 TABLET | Refills: 2 | Status: CANCELLED | OUTPATIENT
Start: 2023-10-18

## 2023-10-23 ASSESSMENT — PATIENT HEALTH QUESTIONNAIRE - PHQ9
7. TROUBLE CONCENTRATING ON THINGS, SUCH AS READING THE NEWSPAPER OR WATCHING TELEVISION: 0
9. THOUGHTS THAT YOU WOULD BE BETTER OFF DEAD, OR OF HURTING YOURSELF: NOT AT ALL
6. FEELING BAD ABOUT YOURSELF - OR THAT YOU ARE A FAILURE OR HAVE LET YOURSELF OR YOUR FAMILY DOWN: 0
5. POOR APPETITE OR OVEREATING: 1
SUM OF ALL RESPONSES TO PHQ QUESTIONS 1-9: 4
SUM OF ALL RESPONSES TO PHQ QUESTIONS 1-9: 4
SUM OF ALL RESPONSES TO PHQ9 QUESTIONS 1 & 2: 0
SUM OF ALL RESPONSES TO PHQ QUESTIONS 1-9: 4
8. MOVING OR SPEAKING SO SLOWLY THAT OTHER PEOPLE COULD HAVE NOTICED. OR THE OPPOSITE, BEING SO FIGETY OR RESTLESS THAT YOU HAVE BEEN MOVING AROUND A LOT MORE THAN USUAL: 0
4. FEELING TIRED OR HAVING LITTLE ENERGY: 1
7. TROUBLE CONCENTRATING ON THINGS, SUCH AS READING THE NEWSPAPER OR WATCHING TELEVISION: NOT AT ALL
3. TROUBLE FALLING OR STAYING ASLEEP: MORE THAN HALF THE DAYS
SUM OF ALL RESPONSES TO PHQ QUESTIONS 1-9: 4
SUM OF ALL RESPONSES TO PHQ QUESTIONS 1-9: 4
8. MOVING OR SPEAKING SO SLOWLY THAT OTHER PEOPLE COULD HAVE NOTICED. OR THE OPPOSITE - BEING SO FIDGETY OR RESTLESS THAT YOU HAVE BEEN MOVING AROUND A LOT MORE THAN USUAL: NOT AT ALL
5. POOR APPETITE OR OVEREATING: SEVERAL DAYS
2. FEELING DOWN, DEPRESSED OR HOPELESS: NOT AT ALL
1. LITTLE INTEREST OR PLEASURE IN DOING THINGS: NOT AT ALL
2. FEELING DOWN, DEPRESSED OR HOPELESS: 0
10. IF YOU CHECKED OFF ANY PROBLEMS, HOW DIFFICULT HAVE THESE PROBLEMS MADE IT FOR YOU TO DO YOUR WORK, TAKE CARE OF THINGS AT HOME, OR GET ALONG WITH OTHER PEOPLE: 0
4. FEELING TIRED OR HAVING LITTLE ENERGY: SEVERAL DAYS
1. LITTLE INTEREST OR PLEASURE IN DOING THINGS: 0
10. IF YOU CHECKED OFF ANY PROBLEMS, HOW DIFFICULT HAVE THESE PROBLEMS MADE IT FOR YOU TO DO YOUR WORK, TAKE CARE OF THINGS AT HOME, OR GET ALONG WITH OTHER PEOPLE: NOT DIFFICULT AT ALL
3. TROUBLE FALLING OR STAYING ASLEEP: 2
9. THOUGHTS THAT YOU WOULD BE BETTER OFF DEAD, OR OF HURTING YOURSELF: 0
6. FEELING BAD ABOUT YOURSELF - OR THAT YOU ARE A FAILURE OR HAVE LET YOURSELF OR YOUR FAMILY DOWN: NOT AT ALL

## 2023-10-24 ENCOUNTER — OFFICE VISIT (OUTPATIENT)
Dept: FAMILY MEDICINE CLINIC | Facility: CLINIC | Age: 53
End: 2023-10-24
Payer: COMMERCIAL

## 2023-10-24 VITALS
TEMPERATURE: 97 F | HEART RATE: 65 BPM | BODY MASS INDEX: 23.26 KG/M2 | DIASTOLIC BLOOD PRESSURE: 79 MMHG | HEIGHT: 67 IN | SYSTOLIC BLOOD PRESSURE: 136 MMHG | WEIGHT: 148.2 LBS

## 2023-10-24 DIAGNOSIS — I10 HYPERTENSION, UNSPECIFIED TYPE: ICD-10-CM

## 2023-10-24 DIAGNOSIS — F41.1 GAD (GENERALIZED ANXIETY DISORDER): ICD-10-CM

## 2023-10-24 DIAGNOSIS — G47.00 INSOMNIA, UNSPECIFIED TYPE: ICD-10-CM

## 2023-10-24 DIAGNOSIS — M15.9 OSTEOARTHRITIS OF MULTIPLE JOINTS, UNSPECIFIED OSTEOARTHRITIS TYPE: ICD-10-CM

## 2023-10-24 DIAGNOSIS — E78.5 HYPERLIPIDEMIA, UNSPECIFIED HYPERLIPIDEMIA TYPE: ICD-10-CM

## 2023-10-24 DIAGNOSIS — F10.90 ALCOHOL USE DISORDER: ICD-10-CM

## 2023-10-24 DIAGNOSIS — F17.210 TOBACCO DEPENDENCE DUE TO CIGARETTES: ICD-10-CM

## 2023-10-24 DIAGNOSIS — Z00.00 ANNUAL PHYSICAL EXAM: Primary | ICD-10-CM

## 2023-10-24 DIAGNOSIS — Z12.11 COLON CANCER SCREENING: ICD-10-CM

## 2023-10-24 DIAGNOSIS — F33.9 RECURRENT MAJOR DEPRESSIVE DISORDER, REMISSION STATUS UNSPECIFIED (HCC): ICD-10-CM

## 2023-10-24 PROCEDURE — 3075F SYST BP GE 130 - 139MM HG: CPT | Performed by: FAMILY MEDICINE

## 2023-10-24 PROCEDURE — 99396 PREV VISIT EST AGE 40-64: CPT | Performed by: FAMILY MEDICINE

## 2023-10-24 PROCEDURE — 3078F DIAST BP <80 MM HG: CPT | Performed by: FAMILY MEDICINE

## 2023-10-24 PROCEDURE — 99214 OFFICE O/P EST MOD 30 MIN: CPT | Performed by: FAMILY MEDICINE

## 2023-10-24 RX ORDER — FOLIC ACID 1 MG/1
1 TABLET ORAL DAILY
Qty: 100 TABLET | Refills: 1 | Status: SHIPPED | OUTPATIENT
Start: 2023-10-24

## 2023-10-24 RX ORDER — MELOXICAM 15 MG/1
15 TABLET ORAL DAILY PRN
Qty: 100 TABLET | Refills: 1 | Status: SHIPPED | OUTPATIENT
Start: 2023-10-24

## 2023-10-24 RX ORDER — HYDROXYZINE PAMOATE 50 MG/1
50 CAPSULE ORAL
Qty: 100 CAPSULE | Refills: 1 | Status: SHIPPED | OUTPATIENT
Start: 2023-10-24

## 2023-10-24 RX ORDER — LOSARTAN POTASSIUM 50 MG/1
50 TABLET ORAL DAILY
Qty: 100 TABLET | Refills: 1 | Status: SHIPPED | OUTPATIENT
Start: 2023-10-24

## 2023-10-24 RX ORDER — ROSUVASTATIN CALCIUM 10 MG/1
10 TABLET, COATED ORAL EVERY EVENING
Qty: 100 TABLET | Refills: 1 | Status: SHIPPED | OUTPATIENT
Start: 2023-10-24

## 2023-10-24 RX ORDER — AMLODIPINE BESYLATE 10 MG/1
10 TABLET ORAL DAILY
Qty: 100 TABLET | Refills: 1 | Status: SHIPPED | OUTPATIENT
Start: 2023-10-24

## 2023-10-24 RX ORDER — DEXTRIN 3 G/3.8 G
100 POWDER (GRAM) ORAL DAILY
COMMUNITY
Start: 2023-07-24

## 2023-10-24 RX ORDER — NALTREXONE HYDROCHLORIDE 50 MG/1
50 TABLET, FILM COATED ORAL
Qty: 100 TABLET | Refills: 1 | Status: SHIPPED | OUTPATIENT
Start: 2023-10-24

## 2023-10-24 ASSESSMENT — ENCOUNTER SYMPTOMS
DIARRHEA: 0
SHORTNESS OF BREATH: 0
CONSTIPATION: 0
BLOOD IN STOOL: 0
ABDOMINAL PAIN: 0
COLOR CHANGE: 0

## 2023-10-24 NOTE — PROGRESS NOTES
[address], [facility] [address]  Phone: [phone]  Fax: [phone]  Email: [email or web address]      Encounter [address] [facility]; [facility] patient 46 y.o.male; seen 10/24/2023 for: Annual Exam, Hypertension, and Hyperlipidemia      Assessment & Plan    1. Annual physical exam  Assessment & Plan:  Discussed ideal body weight and encouraged regular physical activity and healthy diet. Recommended routine preventative measures such as always wearing seatbelt & home safety measures. Counseled the patient regarding the rationale for the recommended immunizations and screenings and they are current and/or updated. 2. Hypertension, unspecified type  Assessment & Plan:  Problem and/or Symptoms are currently stable and/or improving on current treatment plan. Will continue and have patient follow up as directed. Pertinent labs pending & pertinent meds refilled X 6 M  Orders:  -     amLODIPine (NORVASC) 10 MG tablet; Take 1 tablet by mouth daily, Disp-100 tablet, R-1Normal  -     losartan (COZAAR) 50 MG tablet; Take 1 tablet by mouth daily, Disp-100 tablet, R-1Normal  3. Insomnia, unspecified type  Assessment & Plan:  Problem and/or Symptoms are currently not stable and/or well controlled on current treatment plan. Will have patient follow up as directed and make the following changes for further evaluation and/or treatment:     Seems like this is primarily d/t MSK discomfort; will start Mobic QD & f/u response next visit  Orders:  -     hydrOXYzine pamoate (VISTARIL) 50 MG capsule; Take 1 capsule by mouth nightly, Disp-100 capsule, R-1Normal  4. Alcohol use disorder  Assessment & Plan:  Noted: cont Naltrexone & Folic acid supplement  Orders:  -     folic acid (FOLVITE) 1 MG tablet; Take 1 tablet by mouth daily, Disp-100 tablet, R-1Normal  -     naltrexone (DEPADE) 50 MG tablet; Take 1 tablet by mouth nightly, Disp-100 tablet, R-1Normal  5.  Hyperlipidemia,

## 2023-10-24 NOTE — ASSESSMENT & PLAN NOTE
Problem and/or Symptoms are currently stable and/or improving on current treatment plan. Will continue and have patient follow up as directed.     No active Tx needed at this time

## 2023-10-24 NOTE — ASSESSMENT & PLAN NOTE
Problem and/or Symptoms are currently not stable and/or well controlled on current treatment plan.  Will have patient follow up as directed and make the following changes for further evaluation and/or treatment:     Seems like this is primarily d/t MSK discomfort; will start Mobic QD & f/u response next visit

## 2023-10-24 NOTE — ASSESSMENT & PLAN NOTE
Problem and/or Symptoms are currently not stable and/or well controlled on current treatment plan.  Will have patient follow up as directed and make the following changes for further evaluation and/or treatment:     Starting Mobic 15 mg QD

## 2023-10-24 NOTE — PATIENT INSTRUCTIONS
I am checking one or more labs today related to your health concerns and if any results require a change in your treatment regimen you will be notified right away. - Continue taking all the medications on this list as directed; this list is current and please discontinue any medications not on this list. Please call the office or use \"My Chart\" online to request medication refills prior to running out. - Please know that we keep Urgent Care visit slots in reserve every day for any acute illness or injury. If you ever have an urgent issue please call our office and we should typically be able to see you within 24 hours, but most often you will be able to be seen the same day that you call. We also offer Virtual Visits that can be done over a video connection, or regular phone call if you don't have a video connection, if that is your preference vs an office visit. Finally, please make sure you are scheduling your visits with someone who works at our office, [address] [place] [address], and not scheduling with our \"call center\". When you get the phone tree options, press \"Option 2\" first & then \"Option 1\". This combination should take you directly to someone at our office. Please try to avoid scheduling any visits through our call center if at all possible. electronic

## 2023-11-16 ENCOUNTER — OFFICE VISIT (OUTPATIENT)
Dept: FAMILY MEDICINE CLINIC | Facility: CLINIC | Age: 53
End: 2023-11-16
Payer: COMMERCIAL

## 2023-11-16 VITALS
HEIGHT: 67 IN | HEART RATE: 82 BPM | WEIGHT: 149 LBS | TEMPERATURE: 97.1 F | SYSTOLIC BLOOD PRESSURE: 135 MMHG | BODY MASS INDEX: 23.39 KG/M2 | DIASTOLIC BLOOD PRESSURE: 64 MMHG
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
## 2023-11-16 DIAGNOSIS — S16.1XXA STRAIN OF NECK MUSCLE, INITIAL ENCOUNTER: Primary | ICD-10-CM

## 2023-11-16 DIAGNOSIS — I10 HYPERTENSION, UNSPECIFIED TYPE: ICD-10-CM

## 2023-11-16 DIAGNOSIS — M15.9 OSTEOARTHRITIS OF MULTIPLE JOINTS, UNSPECIFIED OSTEOARTHRITIS TYPE: ICD-10-CM

## 2023-11-16 PROCEDURE — 99214 OFFICE O/P EST MOD 30 MIN: CPT | Performed by: FAMILY MEDICINE

## 2023-11-16 PROCEDURE — 3075F SYST BP GE 130 - 139MM HG: CPT | Performed by: FAMILY MEDICINE

## 2023-11-16 PROCEDURE — 3078F DIAST BP <80 MM HG: CPT | Performed by: FAMILY MEDICINE

## 2023-11-16 RX ORDER — PREDNISONE 20 MG/1
TABLET ORAL
Qty: 18 TABLET | Refills: 0 | Status: SHIPPED | OUTPATIENT
Start: 2023-11-16 | End: 2023-11-26

## 2023-11-16 RX ORDER — CYCLOBENZAPRINE HCL 10 MG
10 TABLET ORAL 3 TIMES DAILY PRN
Qty: 90 TABLET | Refills: 2 | Status: SHIPPED | OUTPATIENT
Start: 2023-11-16

## 2023-11-16 NOTE — PATIENT INSTRUCTIONS

## 2023-11-16 NOTE — ASSESSMENT & PLAN NOTE
Problem and/or Symptoms are currently stable and/or improving on current treatment plan. Will continue and have patient follow up as directed.     Cont same regimen
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
## 2023-11-16 NOTE — ASSESSMENT & PLAN NOTE
Problem and/or Symptoms are currently stable and/or improving on current treatment plan. Will continue and have patient follow up as directed.     Shoulder OA much improved with Mobic; cont same regimen

## 2023-12-06 ENCOUNTER — OFFICE VISIT (OUTPATIENT)
Dept: FAMILY MEDICINE CLINIC | Facility: CLINIC | Age: 53
End: 2023-12-06
Payer: COMMERCIAL

## 2023-12-06 VITALS — TEMPERATURE: 97.1 F | DIASTOLIC BLOOD PRESSURE: 76 MMHG | HEART RATE: 64 BPM | SYSTOLIC BLOOD PRESSURE: 139 MMHG
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
## 2023-12-06 DIAGNOSIS — S19.9XXA TRAUMATIC INJURY OF NECK WITH IMPAIRED RANGE OF MOTION: ICD-10-CM

## 2023-12-06 DIAGNOSIS — S13.4XXA TRAUMATIC INJURY OF NECK WITH IMPAIRED RANGE OF MOTION: ICD-10-CM

## 2023-12-06 DIAGNOSIS — M15.9 OSTEOARTHRITIS OF MULTIPLE JOINTS, UNSPECIFIED OSTEOARTHRITIS TYPE: Primary | ICD-10-CM

## 2023-12-06 DIAGNOSIS — M54.2 NECK PAIN: ICD-10-CM

## 2023-12-06 PROCEDURE — 3075F SYST BP GE 130 - 139MM HG: CPT | Performed by: FAMILY MEDICINE

## 2023-12-06 PROCEDURE — 99214 OFFICE O/P EST MOD 30 MIN: CPT | Performed by: FAMILY MEDICINE

## 2023-12-06 PROCEDURE — 3078F DIAST BP <80 MM HG: CPT | Performed by: FAMILY MEDICINE

## 2023-12-06 RX ORDER — GABAPENTIN 100 MG/1
100 CAPSULE ORAL 3 TIMES DAILY
Qty: 90 CAPSULE | Refills: 2 | Status: SHIPPED | OUTPATIENT
Start: 2023-12-06 | End: 2024-12-06
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
## 2023-12-06 NOTE — PATIENT INSTRUCTIONS
Please know that we keep Urgent Care visit slots in reserve every day for any acute illness or injury. If you ever have an urgent issue please call our office and we should typically be able to see you within 24 hours, but most often you will be able to be seen the same day that you call. We also offer Virtual Visits that can be done over a video connection, or regular phone call if you don't have a video connection, if that is your preference vs an office visit. Finally, please make sure you are scheduling your visits with someone who works at our office, [address] [place] [address], and not scheduling with our \"call center\". When you get the phone tree options, press \"Option 2\" first & then \"Option 1\". This combination should take you directly to someone at our office. Please try to avoid scheduling any visits through our call center if at all possible.

## 2023-12-11 ENCOUNTER — TELEPHONE (OUTPATIENT)
Dept: FAMILY MEDICINE CLINIC | Facility: CLINIC | Age: 53
End: 2023-12-11

## 2023-12-14 ENCOUNTER — TELEPHONE (OUTPATIENT)
Dept: ORTHOPEDIC SURGERY | Age: 53
End: 2023-12-14

## 2024-01-03 ENCOUNTER — OFFICE VISIT (OUTPATIENT)
Dept: ORTHOPEDIC SURGERY | Age: 54
End: 2024-01-03
Payer: COMMERCIAL

## 2024-01-03 VITALS — BODY MASS INDEX: 23.79 KG/M2 | HEIGHT: 68 IN | WEIGHT: 157 LBS

## 2024-01-03 DIAGNOSIS — M54.2 NECK PAIN: Primary | ICD-10-CM

## 2024-01-03 DIAGNOSIS — M47.812 ARTHROPATHY OF CERVICAL FACET JOINT: ICD-10-CM

## 2024-01-03 DIAGNOSIS — M50.30 DEGENERATIVE DISC DISEASE, CERVICAL: ICD-10-CM

## 2024-01-03 PROCEDURE — 99204 OFFICE O/P NEW MOD 45 MIN: CPT | Performed by: NURSE PRACTITIONER

## 2024-01-03 RX ORDER — CELECOXIB 200 MG/1
200 CAPSULE ORAL DAILY PRN
Qty: 30 CAPSULE | Refills: 0 | Status: SHIPPED | OUTPATIENT
Start: 2024-01-03

## 2024-01-03 RX ORDER — METHYLPREDNISOLONE 4 MG/1
TABLET ORAL
Qty: 1 KIT | Refills: 0 | Status: SHIPPED | OUTPATIENT
Start: 2024-01-03

## 2024-01-03 RX ORDER — TIZANIDINE 2 MG/1
2-4 TABLET ORAL EVERY 8 HOURS PRN
Qty: 30 TABLET | Refills: 0 | Status: SHIPPED | OUTPATIENT
Start: 2024-01-03

## 2024-01-19 RX ORDER — CELECOXIB 200 MG/1
200 CAPSULE ORAL DAILY PRN
Qty: 30 CAPSULE | Refills: 0 | OUTPATIENT
Start: 2024-01-19

## 2024-02-23 ENCOUNTER — OFFICE VISIT (OUTPATIENT)
Dept: ORTHOPEDIC SURGERY | Age: 54
End: 2024-02-23
Payer: COMMERCIAL

## 2024-02-23 DIAGNOSIS — M54.2 NECK PAIN: Primary | ICD-10-CM

## 2024-02-23 DIAGNOSIS — M50.30 DDD (DEGENERATIVE DISC DISEASE), CERVICAL: ICD-10-CM

## 2024-02-23 DIAGNOSIS — M47.812 ARTHROPATHY OF CERVICAL FACET JOINT: ICD-10-CM

## 2024-02-23 PROCEDURE — 99213 OFFICE O/P EST LOW 20 MIN: CPT | Performed by: NURSE PRACTITIONER
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
## 2024-02-27 NOTE — TELEPHONE ENCOUNTER
----- Message from [place] Jose Antonio sent at 4/25/2023  1:13 PM EDT -----  Subject: Message to Provider    QUESTIONS  Information for Provider? pt needs a call back to obtain letter for   employment, pts is trying to go back to work at [employer] and job needs to know   about his \"shaking\" and stating he is okay to go to work.   ---------------------------------------------------------------------------  --------------  Sherin [facility]  [phone]; OK to leave message on voicemail  ---------------------------------------------------------------------------  --------------  SCRIPT ANSWERS  Relationship to Patient?  Self
Elliot Dorantes... as this is a new problem that we've never discussed before, I can't write any notes or letters about it until we have a visit to discuss & document the problem.  Please advise pt to schedule a visit to address this; minerva
I don't know what this is referring to? At his last visit with me I noted that pt had not worked for the past 6+ months, but that was a result of him having orthopedic surgery; what \"shaking\" is he referring to? Is it something related to his surgery or some other problem?
Notified patient and scheduled an appointment.
Pt stated he was on allergy medication and made him jittery and he is off of that. And when yall met at his last visit he didn't have a issue. [employer] is wanting a letter stating that his jittery is from an allergy medication that he was taking. Pt stated him and allergy medication does not get along.
yes...

## 2024-03-14 ENCOUNTER — OFFICE VISIT (OUTPATIENT)
Dept: ORTHOPEDIC SURGERY | Age: 54
End: 2024-03-14
Payer: COMMERCIAL

## 2024-03-14 DIAGNOSIS — M47.812 ARTHROPATHY OF CERVICAL FACET JOINT: Primary | ICD-10-CM

## 2024-03-14 DIAGNOSIS — M50.30 DEGENERATIVE DISC DISEASE, CERVICAL: ICD-10-CM

## 2024-03-14 DIAGNOSIS — M48.02 FORAMINAL STENOSIS OF CERVICAL REGION: ICD-10-CM

## 2024-03-14 PROCEDURE — 99213 OFFICE O/P EST LOW 20 MIN: CPT | Performed by: NURSE PRACTITIONER
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
## 2024-04-24 ENCOUNTER — TELEPHONE (OUTPATIENT)
Dept: FAMILY MEDICINE CLINIC | Facility: CLINIC | Age: 54
End: 2024-04-24

## 2024-04-24 NOTE — TELEPHONE ENCOUNTER
----- Message from Sharonda Brady sent at 4/23/2024  1:55 PM EDT -----  Subject: Refill Request    QUESTIONS  Name of Medication? losartan (COZAAR) 50 MG tablet  Patient-reported dosage and instructions? 1 day  How many days do you have left? 10  Preferred Pharmacy? [id number]  Pharmacy phone number (if available)? [phone]  Additional Information for Provider? Has appt 5/1. Please refill.  ---------------------------------------------------------------------------  --------------,  Name of Medication? meloxicam (MOBIC) 15 MG tablet  Patient-reported dosage and instructions? 1 day  How many days do you have left? 6  Preferred Pharmacy? [id number]  Pharmacy phone number (if available)? [phone]  ---------------------------------------------------------------------------  --------------,  Name of Medication? hydrOXYzine pamoate (VISTARIL) 50 MG capsule  Patient-reported dosage and instructions? 1 day  How many days do you have left? 4  Preferred Pharmacy? [id number]  Pharmacy phone number (if available)? [phone]  ---------------------------------------------------------------------------  --------------  CALL BACK INFO  What is the best way for the office to contact you? OK to leave message on   voicemail  Preferred Call Back Phone Number? [phone]  ---------------------------------------------------------------------------  --------------  SCRIPT ANSWERS  Relationship to Patient? Self

## 2024-04-25 DIAGNOSIS — M15.9 OSTEOARTHRITIS OF MULTIPLE JOINTS, UNSPECIFIED OSTEOARTHRITIS TYPE: ICD-10-CM

## 2024-04-25 DIAGNOSIS — G47.00 INSOMNIA, UNSPECIFIED TYPE: ICD-10-CM

## 2024-04-25 DIAGNOSIS — I10 HYPERTENSION, UNSPECIFIED TYPE: ICD-10-CM

## 2024-04-25 RX ORDER — MELOXICAM 15 MG/1
15 TABLET ORAL DAILY PRN
Qty: 30 TABLET | Refills: 0 | Status: CANCELLED | OUTPATIENT
Start: 2024-04-25

## 2024-04-25 RX ORDER — LOSARTAN POTASSIUM 50 MG/1
50 TABLET ORAL DAILY
Qty: 30 TABLET | Refills: 0 | Status: CANCELLED | OUTPATIENT
Start: 2024-04-25

## 2024-04-25 RX ORDER — HYDROXYZINE PAMOATE 50 MG/1
50 CAPSULE ORAL
Qty: 30 CAPSULE | Refills: 0 | Status: CANCELLED | OUTPATIENT
Start: 2024-04-25

## 2024-04-25 NOTE — TELEPHONE ENCOUNTER
Pt has schedule his visit asking for medication refills on the following below:        ----- Message from Sharonda Brady sent at 4/23/2024  1:55 PM EDT -----  Subject: Refill Request    QUESTIONS  Name of Medication? losartan (COZAAR) 50 MG tablet  Patient-reported dosage and instructions? 1 day  How many days do you have left? 10  Preferred Pharmacy? [id number]  Pharmacy phone number (if available)? [phone]  Additional Information for Provider? Has appt 5/1. Please refill.  ---------------------------------------------------------------------------  --------------,  Name of Medication? meloxicam (MOBIC) 15 MG tablet  Patient-reported dosage and instructions? 1 day  How many days do you have left? 6  Preferred Pharmacy? [id number]  Pharmacy phone number (if available)? [phone]  ---------------------------------------------------------------------------  --------------,  Name of Medication? hydrOXYzine pamoate (VISTARIL) 50 MG capsule  Patient-reported dosage and instructions? 1 day  How many days do you have left? 4  Preferred Pharmacy? [id number]  Pharmacy phone number (if available)? [phone]  ---------------------------------------------------------------------------  --------------  CALL BACK INFO  What is the best way for the office to contact you? OK to leave message on   voicemail  Preferred Call Back Phone Number? [phone]  ---------------------------------------------------------------------------  --------------  SCRIPT ANSWERS  Relationship to Patient? Self

## 2024-04-29 SDOH — ECONOMIC STABILITY: FOOD INSECURITY: WITHIN THE PAST 12 MONTHS, YOU WORRIED THAT YOUR FOOD WOULD RUN OUT BEFORE YOU GOT MONEY TO BUY MORE.: NEVER TRUE

## 2024-04-29 SDOH — ECONOMIC STABILITY: HOUSING INSECURITY
IN THE LAST 12 MONTHS, WAS THERE A TIME WHEN YOU DID NOT HAVE A STEADY PLACE TO SLEEP OR SLEPT IN A SHELTER (INCLUDING NOW)?: NO

## 2024-04-29 SDOH — ECONOMIC STABILITY: FOOD INSECURITY: WITHIN THE PAST 12 MONTHS, THE FOOD YOU BOUGHT JUST DIDN'T LAST AND YOU DIDN'T HAVE MONEY TO GET MORE.: NEVER TRUE

## 2024-04-29 SDOH — ECONOMIC STABILITY: INCOME INSECURITY: HOW HARD IS IT FOR YOU TO PAY FOR THE VERY BASICS LIKE FOOD, HOUSING, MEDICAL CARE, AND HEATING?: NOT HARD AT ALL

## 2024-04-29 SDOH — ECONOMIC STABILITY: TRANSPORTATION INSECURITY
IN THE PAST 12 MONTHS, HAS LACK OF TRANSPORTATION KEPT YOU FROM MEETINGS, WORK, OR FROM GETTING THINGS NEEDED FOR DAILY LIVING?: NO

## 2024-04-29 ASSESSMENT — PATIENT HEALTH QUESTIONNAIRE - PHQ9
10. IF YOU CHECKED OFF ANY PROBLEMS, HOW DIFFICULT HAVE THESE PROBLEMS MADE IT FOR YOU TO DO YOUR WORK, TAKE CARE OF THINGS AT HOME, OR GET ALONG WITH OTHER PEOPLE: NOT DIFFICULT AT ALL
10. IF YOU CHECKED OFF ANY PROBLEMS, HOW DIFFICULT HAVE THESE PROBLEMS MADE IT FOR YOU TO DO YOUR WORK, TAKE CARE OF THINGS AT HOME, OR GET ALONG WITH OTHER PEOPLE: NOT DIFFICULT AT ALL
9. THOUGHTS THAT YOU WOULD BE BETTER OFF DEAD, OR OF HURTING YOURSELF: NOT AT ALL
1. LITTLE INTEREST OR PLEASURE IN DOING THINGS: NOT AT ALL
2. FEELING DOWN, DEPRESSED OR HOPELESS: NOT AT ALL
SUM OF ALL RESPONSES TO PHQ QUESTIONS 1-9: 0
9. THOUGHTS THAT YOU WOULD BE BETTER OFF DEAD, OR OF HURTING YOURSELF: NOT AT ALL
6. FEELING BAD ABOUT YOURSELF - OR THAT YOU ARE A FAILURE OR HAVE LET YOURSELF OR YOUR FAMILY DOWN: NOT AT ALL
8. MOVING OR SPEAKING SO SLOWLY THAT OTHER PEOPLE COULD HAVE NOTICED. OR THE OPPOSITE - BEING SO FIDGETY OR RESTLESS THAT YOU HAVE BEEN MOVING AROUND A LOT MORE THAN USUAL: NOT AT ALL
5. POOR APPETITE OR OVEREATING: NOT AT ALL
8. MOVING OR SPEAKING SO SLOWLY THAT OTHER PEOPLE COULD HAVE NOTICED. OR THE OPPOSITE, BEING SO FIGETY OR RESTLESS THAT YOU HAVE BEEN MOVING AROUND A LOT MORE THAN USUAL: NOT AT ALL
3. TROUBLE FALLING OR STAYING ASLEEP: NOT AT ALL
6. FEELING BAD ABOUT YOURSELF - OR THAT YOU ARE A FAILURE OR HAVE LET YOURSELF OR YOUR FAMILY DOWN: NOT AT ALL
4. FEELING TIRED OR HAVING LITTLE ENERGY: NOT AT ALL
4. FEELING TIRED OR HAVING LITTLE ENERGY: NOT AT ALL
5. POOR APPETITE OR OVEREATING: NOT AT ALL
SUM OF ALL RESPONSES TO PHQ QUESTIONS 1-9: 0
7. TROUBLE CONCENTRATING ON THINGS, SUCH AS READING THE NEWSPAPER OR WATCHING TELEVISION: NOT AT ALL
SUM OF ALL RESPONSES TO PHQ9 QUESTIONS 1 & 2: 0
7. TROUBLE CONCENTRATING ON THINGS, SUCH AS READING THE NEWSPAPER OR WATCHING TELEVISION: NOT AT ALL
SUM OF ALL RESPONSES TO PHQ QUESTIONS 1-9: 0
1. LITTLE INTEREST OR PLEASURE IN DOING THINGS: NOT AT ALL
2. FEELING DOWN, DEPRESSED OR HOPELESS: NOT AT ALL
3. TROUBLE FALLING OR STAYING ASLEEP: NOT AT ALL

## 2024-05-01 ENCOUNTER — OFFICE VISIT (OUTPATIENT)
Dept: FAMILY MEDICINE CLINIC | Facility: CLINIC | Age: 54
End: 2024-05-01
Payer: COMMERCIAL

## 2024-05-01 VITALS
RESPIRATION RATE: 19 BRPM | OXYGEN SATURATION: 98 % | HEIGHT: 68 IN | SYSTOLIC BLOOD PRESSURE: 130 MMHG | HEART RATE: 77 BPM | BODY MASS INDEX: 24.25 KG/M2 | DIASTOLIC BLOOD PRESSURE: 80 MMHG | WEIGHT: 160 LBS

## 2024-05-01 DIAGNOSIS — E78.5 HYPERLIPIDEMIA, UNSPECIFIED HYPERLIPIDEMIA TYPE: ICD-10-CM

## 2024-05-01 DIAGNOSIS — S13.4XXA TRAUMATIC INJURY OF NECK WITH IMPAIRED RANGE OF MOTION: ICD-10-CM

## 2024-05-01 DIAGNOSIS — K13.79 ORAL PAIN: ICD-10-CM

## 2024-05-01 DIAGNOSIS — Z85.819 HISTORY OF ORAL CANCER: ICD-10-CM

## 2024-05-01 DIAGNOSIS — Z12.11 COLON CANCER SCREENING: ICD-10-CM

## 2024-05-01 DIAGNOSIS — I10 HYPERTENSION, UNSPECIFIED TYPE: Primary | ICD-10-CM

## 2024-05-01 DIAGNOSIS — M15.9 OSTEOARTHRITIS OF MULTIPLE JOINTS, UNSPECIFIED OSTEOARTHRITIS TYPE: ICD-10-CM

## 2024-05-01 DIAGNOSIS — I10 HYPERTENSION, UNSPECIFIED TYPE: ICD-10-CM

## 2024-05-01 DIAGNOSIS — G47.00 INSOMNIA, UNSPECIFIED TYPE: ICD-10-CM

## 2024-05-01 DIAGNOSIS — Z87.898 HISTORY OF ALCOHOL USE DISORDER: ICD-10-CM

## 2024-05-01 DIAGNOSIS — S19.9XXA TRAUMATIC INJURY OF NECK WITH IMPAIRED RANGE OF MOTION: ICD-10-CM

## 2024-05-01 DIAGNOSIS — K13.70 ORAL MUCOSAL LESION: ICD-10-CM

## 2024-05-01 LAB
ALBUMIN SERPL-MCNC: 4.5 G/DL (ref 3.5–5)
ALBUMIN/GLOB SERPL: 1.6 (ref 1–1.9)
ALP SERPL-CCNC: 91 U/L (ref 40–129)
ALT SERPL-CCNC: 14 U/L (ref 12–65)
ANION GAP SERPL CALC-SCNC: 11 MMOL/L (ref 9–18)
AST SERPL-CCNC: 29 U/L (ref 15–37)
BILIRUB SERPL-MCNC: 0.4 MG/DL (ref 0–1.2)
BUN SERPL-MCNC: 8 MG/DL (ref 6–23)
CALCIUM SERPL-MCNC: 9.4 MG/DL (ref 8.8–10.2)
CHLORIDE SERPL-SCNC: 101 MMOL/L (ref 98–107)
CHOLEST SERPL-MCNC: 138 MG/DL (ref 0–200)
CO2 SERPL-SCNC: 27 MMOL/L (ref 20–28)
CREAT SERPL-MCNC: 0.81 MG/DL (ref 0.8–1.3)
GLOBULIN SER CALC-MCNC: 2.9 G/DL (ref 2.3–3.5)
GLUCOSE SERPL-MCNC: 104 MG/DL (ref 70–99)
HDLC SERPL-MCNC: 60 MG/DL (ref 40–60)
HDLC SERPL: 2.3 (ref 0–5)
LDLC SERPL CALC-MCNC: 64 MG/DL (ref 0–100)
POTASSIUM SERPL-SCNC: 4.3 MMOL/L (ref 3.5–5.1)
PROT SERPL-MCNC: 7.3 G/DL (ref 6.3–8.2)
SODIUM SERPL-SCNC: 139 MMOL/L (ref 136–145)
TRIGL SERPL-MCNC: 73 MG/DL (ref 0–150)
VLDLC SERPL CALC-MCNC: 15 MG/DL (ref 6–23)

## 2024-05-01 PROCEDURE — 3079F DIAST BP 80-89 MM HG: CPT | Performed by: FAMILY MEDICINE

## 2024-05-01 PROCEDURE — 99214 OFFICE O/P EST MOD 30 MIN: CPT | Performed by: FAMILY MEDICINE

## 2024-05-01 PROCEDURE — 3075F SYST BP GE 130 - 139MM HG: CPT | Performed by: FAMILY MEDICINE

## 2024-05-01 RX ORDER — ROSUVASTATIN CALCIUM 10 MG/1
10 TABLET, COATED ORAL EVERY EVENING
Qty: 100 TABLET | Refills: 1 | Status: SHIPPED | OUTPATIENT
Start: 2024-05-01

## 2024-05-01 RX ORDER — NALTREXONE HYDROCHLORIDE 50 MG/1
50 TABLET, FILM COATED ORAL
Qty: 100 TABLET | Refills: 1 | Status: SHIPPED | OUTPATIENT
Start: 2024-05-01

## 2024-05-01 RX ORDER — HYDROXYZINE PAMOATE 50 MG/1
50 CAPSULE ORAL
Qty: 100 CAPSULE | Refills: 1 | Status: SHIPPED | OUTPATIENT
Start: 2024-05-01

## 2024-05-01 RX ORDER — CYCLOBENZAPRINE HCL 10 MG
10 TABLET ORAL 3 TIMES DAILY PRN
Qty: 100 TABLET | Refills: 2 | Status: SHIPPED | OUTPATIENT
Start: 2024-05-01

## 2024-05-01 RX ORDER — AMLODIPINE BESYLATE 10 MG/1
10 TABLET ORAL DAILY
Qty: 100 TABLET | Refills: 1 | Status: SHIPPED | OUTPATIENT
Start: 2024-05-01

## 2024-05-01 RX ORDER — LOSARTAN POTASSIUM 50 MG/1
50 TABLET ORAL DAILY
Qty: 100 TABLET | Refills: 1 | Status: SHIPPED | OUTPATIENT
Start: 2024-05-01

## 2024-05-01 RX ORDER — CELECOXIB 200 MG/1
200 CAPSULE ORAL 2 TIMES DAILY
Qty: 200 CAPSULE | Refills: 1 | Status: SHIPPED | OUTPATIENT
Start: 2024-05-01

## 2024-05-01 RX ORDER — FOLIC ACID 1 MG/1
1 TABLET ORAL DAILY
Qty: 100 TABLET | Refills: 1 | Status: SHIPPED | OUTPATIENT
Start: 2024-05-01

## 2024-05-01 RX ORDER — GABAPENTIN 300 MG/1
300 CAPSULE ORAL 3 TIMES DAILY
Qty: 300 CAPSULE | Refills: 1 | Status: SHIPPED | OUTPATIENT
Start: 2024-05-01 | End: 2024-11-17

## 2024-05-01 RX ORDER — MELOXICAM 15 MG/1
15 TABLET ORAL DAILY PRN
Qty: 100 TABLET | Refills: 1 | Status: CANCELLED | OUTPATIENT
Start: 2024-05-01

## 2024-05-01 NOTE — PROGRESS NOTES
[address] [address]  Phone: [phone]  Fax: [phone]  Email: [email or web address]      Encounter Info  Jules Denilson Chu; Established patient 53 y.o.male; seen 5/1/2024 for: Follow-up      Assessment & Plan    1. Hypertension, unspecified type  Assessment & Plan:  Problem and/or Symptoms are currently stable and/or improving on current treatment plan. Will continue and have patient follow up as directed.    Home BP average is < 140 over 90: Pertinent labs reviewed/pending; pertinent meds RF X 6 M  Orders:  -     losartan (COZAAR) 50 MG tablet; Take 1 tablet by mouth daily, Disp-100 tablet, R-1Normal  -     amLODIPine (NORVASC) 10 MG tablet; Take 1 tablet by mouth daily, Disp-100 tablet, R-1Normal  2. Hyperlipidemia, unspecified hyperlipidemia type  Assessment & Plan:  Problem and/or Symptoms are currently stable and/or improving on current treatment plan. Will continue and have patient follow up as directed.    Pertinent labs reviewed/pending; pertinent meds RF X 6 M  Orders:  -     rosuvastatin (CRESTOR) 10 MG tablet; Take 1 tablet by mouth every evening, Disp-100 tablet, R-1Normal  3. Osteoarthritis of multiple joints, unspecified osteoarthritis type  Assessment & Plan:  Problem and/or Symptoms are currently not stable and/or well controlled on current treatment plan. Will have patient follow up as directed and make the following changes for further evaluation and/or treatment:     Increasing Celebrex regimen from 200 mg QD to BID  Orders:  -     gabapentin (NEURONTIN) 300 MG capsule; Take 1 capsule by mouth 3 times daily for 200 days., Disp-300 capsule, R-1Normal  -     cyclobenzaprine (FLEXERIL) 10 MG tablet; Take 1 tablet by mouth 3 times daily as needed for Muscle spasms, Disp-100 tablet, R-2Normal  -     celecoxib (CELEBREX) 200 MG capsule; Take 1 capsule by mouth 2 times daily, Disp-200 capsule, R-1Normal  4. Traumatic injury of neck with impaired

## 2024-05-01 NOTE — ASSESSMENT & PLAN NOTE
Problem and/or Symptoms are currently stable and/or improving on current treatment plan. Will continue and have patient follow up as directed.    Home BP average is < 140 over 90: Pertinent labs reviewed/pending; pertinent meds RF X 6 M

## 2024-05-01 NOTE — ASSESSMENT & PLAN NOTE
Problem and/or Symptoms are currently stable and/or improving on current treatment plan. Will continue and have patient follow up as directed.    Pertinent labs reviewed/pending; pertinent meds RF X 6 M

## 2024-05-01 NOTE — ASSESSMENT & PLAN NOTE
Problem and/or Symptoms are currently not stable and/or well controlled on current treatment plan. Will have patient follow up as directed and make the following changes for further evaluation and/or treatment:     Increasing Celebrex regimen from 200 mg QD to BID

## 2024-05-01 NOTE — PATIENT INSTRUCTIONS
I am checking one or more labs today related to your health concerns and if any results require a change in your treatment regimen you will be notified right away.    - Continue taking all the medications on this list as directed; this list is current and please discontinue any medications not on this list. Please call the office or use \"My Chart\" online to request medication refills prior to running out.    - PLEASE NOTE: when needing to schedule a visit you should send our office a Rapt message requesting a visit (or call the office if you can't use Rapt) and we will send you a scheduling ticket that will include open dates & times. You can then use this feature to select your preferred date & time for an office visit, virtual visit, and/or lab visit. Please make sure to respond to this scheduling ticket ASAP when received.     Please do NOT schedule any visit through the Rapt self-scheduling feature; any self-scheduling should only be done using the scheduling ticket provided to you directly from our office. Also, please do NOT schedule any visits through our out of state call center. If you ever need to speak to someone at our office directly please follow these instructions: when you call our office number, [phone], and get the phone tree options, press \"Option 2\" first & then \"Option 1\". This combination should take you directly to someone at our office and bypass the out of state call center.

## 2024-05-01 NOTE — ASSESSMENT & PLAN NOTE
Problem and/or Symptoms are currently not stable and/or well controlled on current treatment plan. Will have patient follow up as directed and make the following changes for further evaluation and/or treatment:     Same plan as for OA

## 2024-05-03 DIAGNOSIS — Z85.819 HISTORY OF ORAL CANCER: Primary | ICD-10-CM

## 2024-05-03 NOTE — PROGRESS NOTES
H&N Navigation:  Referral received from Dr. Cristina for new tongue lesions in patient w/ history of oral H&N cancer.  Pt was previously treated by Dr. Dilshad Patterson w/ [facility].  Pt is being referred back to Dr. Patterson at [facility] for biopsy to confirm recurrence.  Pt will be scheduled w/ Dr. Espino after biopsy completed and diagnosis confirmed.   Left pt a voicemail with RD/Navigator contact information and plan for referral back to Dr. Patterson prior to meeting Dr. Espino.     Verena Velasco RD, , LD  [phone]

## 2024-05-07 DIAGNOSIS — Z85.819 HISTORY OF ORAL CANCER: Primary | ICD-10-CM

## 2024-05-07 DIAGNOSIS — K13.70 LESION OF MOUTH: ICD-10-CM

## 2024-05-09 ENCOUNTER — TELEPHONE (OUTPATIENT)
Dept: FAMILY MEDICINE CLINIC | Facility: CLINIC | Age: 54
End: 2024-05-09

## 2024-05-09 NOTE — TELEPHONE ENCOUNTER
Patient calling in saw PCP on 05/01/2024 and discussed concerns regarding his oral cancer returning     Patient called in today stating that he is now experiencing more sx, and he would like to know if it Is possible for PCP to send him in an antibiotic.    Due to the patient just being seen on 05/01/2024 and the circumstances of the situation, is PCP going to want patient to book another appointment to get this?     Patient is requesting a call back as soon as possible    Denilson [phone]

## 2024-05-09 NOTE — TELEPHONE ENCOUNTER
If this is indeed a recurrence of his oral cancer then antibiotics will not help with that. Patient needs to f/u with the specialists he was recently referred to, ENT first & then Oncology if ENT biopsy confirms cancer recurrence.    ENT has apparently been trying to get in touch with him for the past 2 days but has not been able to reach him or get a call back, as below:    Type Date User Summary Attachment   General 05/08/2024 11:51 AM Mary Aldana Made the second attempt to contact the patient using the phone number listed in chart to schedule appointment with our office today. LVM. -   Note:  Made the second attempt to contact the patient using the phone number listed in chart to schedule appointment with our office today. LVM.   .  Type Date User Summary Attachment   General 05/07/2024  1:31 PM Mary Aldana Made the first attempt to contact the patient using the phone number listed in chart to schedule appointment with our office today. LVM.        Please advise pt to contact ENT office for urgent eval of developing oral lesions.

## 2024-05-20 ENCOUNTER — OFFICE VISIT (OUTPATIENT)
Dept: ENT CLINIC | Age: 54
End: 2024-05-20
Payer: COMMERCIAL

## 2024-05-20 VITALS
BODY MASS INDEX: 24.4 KG/M2 | DIASTOLIC BLOOD PRESSURE: 70 MMHG | SYSTOLIC BLOOD PRESSURE: 136 MMHG | WEIGHT: 161 LBS | HEIGHT: 68 IN

## 2024-05-20 DIAGNOSIS — K04.7 DENTAL ABSCESS: ICD-10-CM

## 2024-05-20 DIAGNOSIS — K04.7 DENTAL ABSCESS: Primary | ICD-10-CM

## 2024-05-20 PROCEDURE — 99244 OFF/OP CNSLTJ NEW/EST MOD 40: CPT | Performed by: STUDENT IN AN ORGANIZED HEALTH CARE EDUCATION/TRAINING PROGRAM

## 2024-05-20 PROCEDURE — 3075F SYST BP GE 130 - 139MM HG: CPT | Performed by: STUDENT IN AN ORGANIZED HEALTH CARE EDUCATION/TRAINING PROGRAM

## 2024-05-20 PROCEDURE — 3078F DIAST BP <80 MM HG: CPT | Performed by: STUDENT IN AN ORGANIZED HEALTH CARE EDUCATION/TRAINING PROGRAM

## 2024-05-20 RX ORDER — CLINDAMYCIN HYDROCHLORIDE 300 MG/1
600 CAPSULE ORAL 3 TIMES DAILY
Qty: 42 CAPSULE | Refills: 0 | Status: SHIPPED | OUTPATIENT
Start: 2024-05-20 | End: 2024-05-27

## 2024-05-20 ASSESSMENT — ENCOUNTER SYMPTOMS
ALLERGIC/IMMUNOLOGIC NEGATIVE: 1
GASTROINTESTINAL NEGATIVE: 1
RESPIRATORY NEGATIVE: 1
EYES NEGATIVE: 1

## 2024-05-20 NOTE — PROGRESS NOTES
cyclobenzaprine (FLEXERIL) 10 MG tablet Take 1 tablet by mouth 3 times daily as needed for Muscle spasms    amLODIPine (NORVASC) 10 MG tablet Take 1 tablet by mouth daily    hydrOXYzine pamoate (VISTARIL) 50 MG capsule Take 1 capsule by mouth nightly    celecoxib (CELEBREX) 200 MG capsule Take 1 capsule by mouth 2 times daily    folic acid (FOLVITE) 1 MG tablet Take 1 tablet by mouth daily    naltrexone (DEPADE) 50 MG tablet Take 1 tablet by mouth nightly    vitamin B-1 (THIAMINE) 100 MG tablet Take 1 tablet by mouth daily     No current facility-administered medications for this visit.     Past Medical History:   Diagnosis Date    Cancer (HCC)     Hearing loss     Hypertension      Social History     Tobacco Use    Smoking status: Every Day     Current packs/day: 0.50     Average packs/day: 0.5 packs/day for 15.0 years (7.5 ttl pk-yrs)     Types: Cigarettes    Smokeless tobacco: Never   Substance Use Topics    Alcohol use: Not Currently     Past Surgical History:   Procedure Laterality Date    ORTHOPEDIC SURGERY      right knee     History reviewed. No pertinent family history.     ROS:    Review of Systems   Constitutional: Negative.    HENT:  Positive for mouth sores.    Eyes: Negative.    Respiratory: Negative.     Cardiovascular: Negative.    Gastrointestinal: Negative.    Endocrine: Negative.    Genitourinary: Negative.    Musculoskeletal: Negative.    Skin: Negative.    Allergic/Immunologic: Negative.    Neurological: Negative.    Hematological: Negative.    Psychiatric/Behavioral: Negative.            PHYSICAL EXAM:    /70 (Site: Left Upper Arm, Position: Sitting)   Ht 1.727 m (5' 8\")   Wt 73 kg (161 lb)   BMI 24.48 kg/m²     General: NAD, well-appearing  Neuro: No gross neuro deficits. CN's II-XII intact. No facial weakness.  Eyes: EOMI. Pupils reactive. No periorbital edema/ecchymosis.   Skin: No facial erythema, rashes or concerning lesions.  Nose: No external deviations or saddling.

## 2024-05-21 NOTE — PROGRESS NOTES
NEW PATIENT INTAKE    Referral Diagnosis: History of oral cancer; Oral pain; Oral mucosal lesion    Referring Provider:  Sandip Cristina MD    Primary Care Provider: Sandip Cristina MD    Family History of Cancer/ Hematology Disorders: None reported     Presenting Symptoms: Painful oral mucosal lesions    Chronological History of Pertinent Events: Mr. Sage is a 53-year-old white male referred for oral pain and oral mucosal lesion with a history of oral cancer.     9/21/20: Oral surgery consult with Dr. Reji Resendiz for biopsy of lingual lesion near frenum. Pt states this area was first noted by his dentist.    -Oral exam revealed a 1 x .5 cm lesion on the lingual frenum   -Biopsy performed with pathology revealing superficially invasive SCC.    9/28/20: Post-op f/u with Dr. Resendiz  -STAT referral to ENT    10/1/20: Consult with ENT, Dr. Dilshad Patterson III, for new diagnosis of midline floor of mouth cancer.   - Plan for initial staging imaging to include CT neck and chest with contrast.  - Discussed surgery as recommended management for oral cancer. Tentative stage cT1N0, group stage I with initial staging imaging pending.    10/6/20: CT SOFT TISSUE NECK WITH CONTRAST: Mild nodularity seen posterior base of the tongue and valleculae. No abnormal enhancement is seen in the floor the mouth despite the clinical history of oral carcinoma. Direct visualization of this region would be useful. No evidence of pathologic cervical adenopathy. Mucoperiosteal thickening in the sinuses. Some streak artifact produced by the dental amalgam. Emphysema (CE/Other Results)    10/6/20: CT CHEST W CONTRAST: Tiny nodules are present in the right upper and lower lobes, nonspecific. No mediastinal or hilar adenopathy (CE/Other Results)     10/20/20: Pt underwent composite resection of floor of mouth with ENT, Dr. Dilshad Patterson, at [facility]. Pathology revealed focal high-grade squamous dysplasia but no residual invasive carcinoma was

## 2024-05-23 LAB
BACTERIA SPEC CULT: NORMAL
SERVICE CMNT-IMP: NORMAL

## 2024-05-28 ENCOUNTER — OFFICE VISIT (OUTPATIENT)
Dept: ENT CLINIC | Age: 54
End: 2024-05-28
Payer: COMMERCIAL

## 2024-05-28 VITALS — HEIGHT: 68 IN | DIASTOLIC BLOOD PRESSURE: 68 MMHG | SYSTOLIC BLOOD PRESSURE: 134 MMHG | BODY MASS INDEX: 24.48 KG/M2

## 2024-05-28 DIAGNOSIS — K04.7 DENTAL ABSCESS: Primary | ICD-10-CM

## 2024-05-28 PROCEDURE — 3075F SYST BP GE 130 - 139MM HG: CPT | Performed by: STUDENT IN AN ORGANIZED HEALTH CARE EDUCATION/TRAINING PROGRAM

## 2024-05-28 PROCEDURE — 99213 OFFICE O/P EST LOW 20 MIN: CPT | Performed by: STUDENT IN AN ORGANIZED HEALTH CARE EDUCATION/TRAINING PROGRAM

## 2024-05-28 PROCEDURE — 3078F DIAST BP <80 MM HG: CPT | Performed by: STUDENT IN AN ORGANIZED HEALTH CARE EDUCATION/TRAINING PROGRAM

## 2024-05-28 RX ORDER — SULFAMETHOXAZOLE AND TRIMETHOPRIM 800; 160 MG/1; MG/1
1 TABLET ORAL 2 TIMES DAILY
Qty: 14 TABLET | Refills: 0 | Status: SHIPPED | OUTPATIENT
Start: 2024-05-28 | End: 2024-06-04

## 2024-05-28 ASSESSMENT — ENCOUNTER SYMPTOMS
RESPIRATORY NEGATIVE: 1
ALLERGIC/IMMUNOLOGIC NEGATIVE: 1
EYES NEGATIVE: 1
GASTROINTESTINAL NEGATIVE: 1

## 2024-05-28 NOTE — PROGRESS NOTES
[facility] Office Note    Patient: Jules Sage  MRN: [id number]  : 1970  Gender:  male  Vital Signs: /68 (Site: Left Upper Arm, Position: Sitting)   Ht 1.727 m (5' 8\")   BMI 24.48 kg/m²   Date: 2024    CC:   Chief Complaint   Patient presents with    Follow-up     1 week f/u for oral abscess.        HPI:  Jules Sage is a 53 y.o. male oral cancer treated with surgery by Dr. Patterson in . No adjuvant chemo or radiation. He subsequently had his left submandibular gland removed. He endorses several carious teeth that need to have root canals.  He was found to have left mandibular dental abscess at last office visit on 2024.  This was drained.  He was given a course of clindamycin.  He does not have a dentist yet.  He is feeling better than when I saw him a week ago.    Past Medical History, Past Surgical History, Family history, Social History, and Medications were all reviewed with the patient today and updated as necessary.     No Known Allergies  Patient Active Problem List   Diagnosis    History of alcohol use disorder    OA (osteoarthritis)    HTN (hypertension)    Annual physical exam    Tobacco dependence due to cigarettes    HLD (hyperlipidemia)    Fungal dermatitis    History of oral cancer    EDWARDO (generalized anxiety disorder)    MDD (major depressive disorder)    Insomnia    Traumatic injury of neck with impaired range of motion     Current Outpatient Medications   Medication Sig    sulfamethoxazole-trimethoprim (BACTRIM DS;SEPTRA DS) 800-160 MG per tablet Take 1 tablet by mouth 2 times daily for 7 days    losartan (COZAAR) 50 MG tablet Take 1 tablet by mouth daily    rosuvastatin (CRESTOR) 10 MG tablet Take 1 tablet by mouth every evening    gabapentin (NEURONTIN) 300 MG capsule Take 1 capsule by mouth 3 times daily for 200 days.    cyclobenzaprine (FLEXERIL) 10 MG tablet Take 1 tablet by mouth 3 times daily as needed for Muscle spasms    amLODIPine (NORVASC) 10

## 2024-06-10 ENCOUNTER — OFFICE VISIT (OUTPATIENT)
Dept: ENT CLINIC | Age: 54
End: 2024-06-10
Payer: COMMERCIAL

## 2024-06-10 VITALS — RESPIRATION RATE: 17 BRPM | HEIGHT: 68 IN | WEIGHT: 161 LBS | BODY MASS INDEX: 24.4 KG/M2

## 2024-06-10 DIAGNOSIS — K04.7 DENTAL ABSCESS: Primary | ICD-10-CM

## 2024-06-10 DIAGNOSIS — C06.9 ORAL CANCER (HCC): ICD-10-CM

## 2024-06-10 PROCEDURE — 99213 OFFICE O/P EST LOW 20 MIN: CPT | Performed by: STUDENT IN AN ORGANIZED HEALTH CARE EDUCATION/TRAINING PROGRAM

## 2024-06-10 ASSESSMENT — ENCOUNTER SYMPTOMS
RESPIRATORY NEGATIVE: 1
EYES NEGATIVE: 1
GASTROINTESTINAL NEGATIVE: 1
ALLERGIC/IMMUNOLOGIC NEGATIVE: 1

## 2024-06-10 NOTE — PROGRESS NOTES
[facility] Office Note    Patient: Jules Sage  MRN: [id number]  : 1970  Gender:  male  Vital Signs: Resp 17   Ht 1.727 m (5' 8\")   Wt 73 kg (161 lb)   BMI 24.48 kg/m²   Date: 6/10/2024    CC:   Chief Complaint   Patient presents with    Follow-up     1 week f/u on dental abscess       HPI:  Jules Sage is a 53 y.o. male w/ h/o oral cancer treated with surgery by Dr. Patterson in . No adjuvant chemo or radiation. He subsequently had his left submandibular gland removed. He endorses several carious teeth that need to have root canals.  He was found to have left mandibular dental abscess at last office visit on 2024.  This was drained.  He was given a course of clindamycin.  There was reaccumulation this was drained again on 2024.  He was sent home with Bactrim.  He is doing well today.  He feels infection has resolved.  He has appointment with dentist at the end of the month.    Past Medical History, Past Surgical History, Family history, Social History, and Medications were all reviewed with the patient today and updated as necessary.     No Known Allergies  Patient Active Problem List   Diagnosis    History of alcohol use disorder    OA (osteoarthritis)    HTN (hypertension)    Annual physical exam    Tobacco dependence due to cigarettes    HLD (hyperlipidemia)    Fungal dermatitis    History of oral cancer    EDWARDO (generalized anxiety disorder)    MDD (major depressive disorder)    Insomnia    Traumatic injury of neck with impaired range of motion     Current Outpatient Medications   Medication Sig    losartan (COZAAR) 50 MG tablet Take 1 tablet by mouth daily    rosuvastatin (CRESTOR) 10 MG tablet Take 1 tablet by mouth every evening    gabapentin (NEURONTIN) 300 MG capsule Take 1 capsule by mouth 3 times daily for 200 days.    cyclobenzaprine (FLEXERIL) 10 MG tablet Take 1 tablet by mouth 3 times daily as needed for Muscle spasms    amLODIPine (NORVASC) 10 MG tablet Take 1

## 2024-07-17 LAB — NONINV COLON CA DNA+OCC BLD SCRN STL QL: NEGATIVE

## 2024-07-31 DIAGNOSIS — I10 HYPERTENSION, UNSPECIFIED TYPE: ICD-10-CM

## 2024-07-31 DIAGNOSIS — S19.9XXA TRAUMATIC INJURY OF NECK WITH IMPAIRED RANGE OF MOTION: ICD-10-CM

## 2024-07-31 DIAGNOSIS — S13.4XXA TRAUMATIC INJURY OF NECK WITH IMPAIRED RANGE OF MOTION: ICD-10-CM

## 2024-07-31 DIAGNOSIS — M15.9 OSTEOARTHRITIS OF MULTIPLE JOINTS, UNSPECIFIED OSTEOARTHRITIS TYPE: ICD-10-CM

## 2024-07-31 DIAGNOSIS — E78.5 HYPERLIPIDEMIA, UNSPECIFIED HYPERLIPIDEMIA TYPE: ICD-10-CM

## 2024-07-31 DIAGNOSIS — G47.00 INSOMNIA, UNSPECIFIED TYPE: ICD-10-CM

## 2024-07-31 RX ORDER — AMLODIPINE BESYLATE 10 MG/1
10 TABLET ORAL DAILY
Qty: 100 TABLET | Refills: 1 | Status: CANCELLED | OUTPATIENT
Start: 2024-07-31

## 2024-07-31 RX ORDER — HYDROXYZINE PAMOATE 50 MG/1
50 CAPSULE ORAL
Qty: 100 CAPSULE | Refills: 1 | Status: CANCELLED | OUTPATIENT
Start: 2024-07-31

## 2024-07-31 RX ORDER — ROSUVASTATIN CALCIUM 10 MG/1
10 TABLET, COATED ORAL EVERY EVENING
Qty: 100 TABLET | Refills: 1 | Status: CANCELLED | OUTPATIENT
Start: 2024-07-31

## 2024-08-06 RX ORDER — CYCLOBENZAPRINE HCL 10 MG
10 TABLET ORAL 3 TIMES DAILY PRN
Qty: 100 TABLET | Refills: 2 | OUTPATIENT
Start: 2024-08-06

## 2024-08-06 RX ORDER — LOSARTAN POTASSIUM 50 MG/1
50 TABLET ORAL DAILY
Qty: 100 TABLET | Refills: 1 | OUTPATIENT
Start: 2024-08-06

## 2024-08-10 DIAGNOSIS — Z87.898 HISTORY OF ALCOHOL USE DISORDER: ICD-10-CM

## 2024-08-10 DIAGNOSIS — I10 HYPERTENSION, UNSPECIFIED TYPE: ICD-10-CM

## 2024-08-10 DIAGNOSIS — M15.9 OSTEOARTHRITIS OF MULTIPLE JOINTS, UNSPECIFIED OSTEOARTHRITIS TYPE: ICD-10-CM

## 2024-08-10 DIAGNOSIS — E78.5 HYPERLIPIDEMIA, UNSPECIFIED HYPERLIPIDEMIA TYPE: ICD-10-CM

## 2024-08-10 DIAGNOSIS — S13.4XXA TRAUMATIC INJURY OF NECK WITH IMPAIRED RANGE OF MOTION: ICD-10-CM

## 2024-08-10 DIAGNOSIS — S19.9XXA TRAUMATIC INJURY OF NECK WITH IMPAIRED RANGE OF MOTION: ICD-10-CM

## 2024-08-10 DIAGNOSIS — G47.00 INSOMNIA, UNSPECIFIED TYPE: ICD-10-CM

## 2024-08-10 RX ORDER — ROSUVASTATIN CALCIUM 10 MG/1
10 TABLET, COATED ORAL EVERY EVENING
Qty: 100 TABLET | Refills: 1 | Status: CANCELLED | OUTPATIENT
Start: 2024-08-10

## 2024-08-16 RX ORDER — AMLODIPINE BESYLATE 10 MG/1
10 TABLET ORAL DAILY
Qty: 100 TABLET | Refills: 0 | Status: SHIPPED | OUTPATIENT
Start: 2024-08-16

## 2024-08-16 RX ORDER — CYCLOBENZAPRINE HCL 10 MG
10 TABLET ORAL 3 TIMES DAILY PRN
Qty: 100 TABLET | Refills: 0 | Status: SHIPPED | OUTPATIENT
Start: 2024-08-16

## 2024-08-16 RX ORDER — LOSARTAN POTASSIUM 50 MG/1
50 TABLET ORAL DAILY
Qty: 100 TABLET | Refills: 0 | Status: SHIPPED | OUTPATIENT
Start: 2024-08-16

## 2024-08-16 RX ORDER — CELECOXIB 200 MG/1
200 CAPSULE ORAL 2 TIMES DAILY
Qty: 200 CAPSULE | Refills: 0 | Status: SHIPPED | OUTPATIENT
Start: 2024-08-16

## 2024-08-16 RX ORDER — FOLIC ACID 1 MG/1
1 TABLET ORAL DAILY
Qty: 100 TABLET | Refills: 0 | Status: SHIPPED | OUTPATIENT
Start: 2024-08-16

## 2024-08-16 RX ORDER — GABAPENTIN 300 MG/1
300 CAPSULE ORAL 3 TIMES DAILY
Qty: 300 CAPSULE | Refills: 0 | Status: SHIPPED | OUTPATIENT
Start: 2024-08-16 | End: 2025-03-04

## 2024-08-16 RX ORDER — HYDROXYZINE PAMOATE 50 MG/1
50 CAPSULE ORAL
Qty: 100 CAPSULE | Refills: 0 | Status: SHIPPED | OUTPATIENT
Start: 2024-08-16

## 2024-08-16 RX ORDER — ROSUVASTATIN CALCIUM 10 MG/1
10 TABLET, COATED ORAL EVERY EVENING
Qty: 100 TABLET | Refills: 0 | Status: SHIPPED | OUTPATIENT
Start: 2024-08-16

## 2024-08-16 RX ORDER — NALTREXONE HYDROCHLORIDE 50 MG/1
50 TABLET, FILM COATED ORAL
Qty: 100 TABLET | Refills: 0 | Status: SHIPPED | OUTPATIENT
Start: 2024-08-16

## 2024-08-16 NOTE — TELEPHONE ENCOUNTER
Pt changing back to [facility], I did tell him can call the pharmacy and they can transfer the medications for him but he seems like that request is being ignored, because he keep sending request for refills. Pending the medication with 0 refills being that this a six month supply and the last refill this month. TKS :)

## 2024-10-21 ENCOUNTER — OFFICE VISIT (OUTPATIENT)
Dept: FAMILY MEDICINE CLINIC | Facility: CLINIC | Age: 54
End: 2024-10-21
Payer: COMMERCIAL

## 2024-10-21 VITALS
HEIGHT: 68 IN | OXYGEN SATURATION: 97 % | BODY MASS INDEX: 23.95 KG/M2 | DIASTOLIC BLOOD PRESSURE: 84 MMHG | WEIGHT: 158 LBS | RESPIRATION RATE: 19 BRPM | TEMPERATURE: 98.2 F | SYSTOLIC BLOOD PRESSURE: 141 MMHG | HEART RATE: 82 BPM

## 2024-10-21 DIAGNOSIS — S13.4XXA TRAUMATIC INJURY OF NECK WITH IMPAIRED RANGE OF MOTION: ICD-10-CM

## 2024-10-21 DIAGNOSIS — M15.9 OSTEOARTHRITIS OF MULTIPLE JOINTS, UNSPECIFIED OSTEOARTHRITIS TYPE: ICD-10-CM

## 2024-10-21 DIAGNOSIS — I10 HYPERTENSION, UNSPECIFIED TYPE: ICD-10-CM

## 2024-10-21 DIAGNOSIS — Z00.00 ANNUAL PHYSICAL EXAM: Primary | ICD-10-CM

## 2024-10-21 DIAGNOSIS — F33.9 RECURRENT MAJOR DEPRESSIVE DISORDER, REMISSION STATUS UNSPECIFIED (HCC): ICD-10-CM

## 2024-10-21 DIAGNOSIS — G47.00 INSOMNIA, UNSPECIFIED TYPE: ICD-10-CM

## 2024-10-21 DIAGNOSIS — Z87.898 HISTORY OF ALCOHOL USE DISORDER: ICD-10-CM

## 2024-10-21 DIAGNOSIS — E78.5 HYPERLIPIDEMIA, UNSPECIFIED HYPERLIPIDEMIA TYPE: ICD-10-CM

## 2024-10-21 DIAGNOSIS — S19.9XXA TRAUMATIC INJURY OF NECK WITH IMPAIRED RANGE OF MOTION: ICD-10-CM

## 2024-10-21 DIAGNOSIS — F17.210 TOBACCO DEPENDENCE DUE TO CIGARETTES: ICD-10-CM

## 2024-10-21 LAB
ALBUMIN SERPL-MCNC: 4 G/DL (ref 3.5–5)
ALBUMIN/GLOB SERPL: 1.3 (ref 1–1.9)
ALP SERPL-CCNC: 66 U/L (ref 40–129)
ALT SERPL-CCNC: 6 U/L (ref 8–55)
ANION GAP SERPL CALC-SCNC: 10 MMOL/L (ref 9–18)
AST SERPL-CCNC: 31 U/L (ref 15–37)
BILIRUB SERPL-MCNC: 0.6 MG/DL (ref 0–1.2)
BUN SERPL-MCNC: 11 MG/DL (ref 6–23)
CALCIUM SERPL-MCNC: 9.2 MG/DL (ref 8.8–10.2)
CHLORIDE SERPL-SCNC: 102 MMOL/L (ref 98–107)
CHOLEST SERPL-MCNC: 210 MG/DL (ref 0–200)
CO2 SERPL-SCNC: 26 MMOL/L (ref 20–28)
CREAT SERPL-MCNC: 0.74 MG/DL (ref 0.8–1.3)
GLOBULIN SER CALC-MCNC: 3.1 G/DL (ref 2.3–3.5)
GLUCOSE SERPL-MCNC: 93 MG/DL (ref 70–99)
HDLC SERPL-MCNC: 52 MG/DL (ref 40–60)
HDLC SERPL: 4 (ref 0–5)
LDLC SERPL CALC-MCNC: 129 MG/DL (ref 0–100)
POTASSIUM SERPL-SCNC: 4.6 MMOL/L (ref 3.5–5.1)
PROT SERPL-MCNC: 7.1 G/DL (ref 6.3–8.2)
SODIUM SERPL-SCNC: 138 MMOL/L (ref 136–145)
TRIGL SERPL-MCNC: 147 MG/DL (ref 0–150)
VLDLC SERPL CALC-MCNC: 29 MG/DL (ref 6–23)

## 2024-10-21 PROCEDURE — 99214 OFFICE O/P EST MOD 30 MIN: CPT | Performed by: FAMILY MEDICINE

## 2024-10-21 PROCEDURE — 3077F SYST BP >= 140 MM HG: CPT | Performed by: FAMILY MEDICINE

## 2024-10-21 PROCEDURE — 3079F DIAST BP 80-89 MM HG: CPT | Performed by: FAMILY MEDICINE

## 2024-10-21 PROCEDURE — 99396 PREV VISIT EST AGE 40-64: CPT | Performed by: FAMILY MEDICINE

## 2024-10-21 RX ORDER — CYCLOBENZAPRINE HCL 10 MG
10 TABLET ORAL 3 TIMES DAILY PRN
Qty: 100 TABLET | Refills: 5 | Status: SHIPPED | OUTPATIENT
Start: 2024-10-21

## 2024-10-21 RX ORDER — FOLIC ACID 1 MG/1
1 TABLET ORAL DAILY
Qty: 100 TABLET | Refills: 1 | Status: SHIPPED | OUTPATIENT
Start: 2024-10-21

## 2024-10-21 RX ORDER — GABAPENTIN 300 MG/1
300 CAPSULE ORAL 3 TIMES DAILY
Qty: 300 CAPSULE | Refills: 1 | Status: SHIPPED | OUTPATIENT
Start: 2024-10-21 | End: 2025-05-09

## 2024-10-21 RX ORDER — LOSARTAN POTASSIUM 50 MG/1
75 TABLET ORAL DAILY
Qty: 150 TABLET | Refills: 1 | Status: SHIPPED | OUTPATIENT
Start: 2024-10-21

## 2024-10-21 RX ORDER — CELECOXIB 200 MG/1
200 CAPSULE ORAL 2 TIMES DAILY
Qty: 200 CAPSULE | Refills: 1 | Status: SHIPPED | OUTPATIENT
Start: 2024-10-21

## 2024-10-21 RX ORDER — AMLODIPINE BESYLATE 10 MG/1
10 TABLET ORAL DAILY
Qty: 100 TABLET | Refills: 1 | Status: SHIPPED | OUTPATIENT
Start: 2024-10-21

## 2024-10-21 RX ORDER — ROSUVASTATIN CALCIUM 10 MG/1
10 TABLET, COATED ORAL EVERY EVENING
Qty: 100 TABLET | Refills: 1 | Status: SHIPPED | OUTPATIENT
Start: 2024-10-21

## 2024-10-21 RX ORDER — HYDROXYZINE PAMOATE 50 MG/1
50 CAPSULE ORAL
Qty: 100 CAPSULE | Refills: 1 | Status: SHIPPED | OUTPATIENT
Start: 2024-10-21

## 2024-10-21 ASSESSMENT — ENCOUNTER SYMPTOMS
DIARRHEA: 0
SHORTNESS OF BREATH: 0
ABDOMINAL PAIN: 0
CONSTIPATION: 0
BLOOD IN STOOL: 0
COLOR CHANGE: 0

## 2024-10-21 NOTE — PROGRESS NOTES
[place] [address]  Phone: [phone]  Fax: [phone]  Email: [email or web address]      Encounter Info  Jules Denilson Sage; Established patient 54 y.o.male; seen 10/21/2024 for: Annual Exam      Assessment & Plan    1. Annual physical exam  Assessment & Plan:  Discussed ideal body weight and encouraged regular physical activity and healthy diet. Recommended routine preventative measures such as always wearing seatbelt & home safety measures. Counseled the patient regarding the rationale for the recommended immunizations and screenings and they are current and/or updated.    2. Hypertension, unspecified type  Assessment & Plan:  Problem and/or Symptoms are currently not stable and/or well controlled on current treatment plan. Will have patient follow up as directed and make the following changes for further evaluation and/or treatment:     Cont Norvasc 10 mg QD & increasing Losartan from 50 to 75 mg QD    Orders:  -     amLODIPine (NORVASC) 10 MG tablet; Take 1 tablet by mouth daily, Disp-100 tablet, R-1Normal  -     losartan (COZAAR) 50 MG tablet; Take 1.5 tablets by mouth daily, Disp-150 tablet, R-1Normal  3. Osteoarthritis of multiple joints, unspecified osteoarthritis type  Assessment & Plan:  Problem and/or Symptoms are currently stable and/or improving on current treatment plan. Will continue and have patient follow up as directed.    Pertinent labs reviewed/pending; pertinent meds RF X 6 M  Orders:  -     celecoxib (CELEBREX) 200 MG capsule; Take 1 capsule by mouth 2 times daily, Disp-200 capsule, R-1Normal  -     cyclobenzaprine (FLEXERIL) 10 MG tablet; Take 1 tablet by mouth 3 times daily as needed for Muscle spasms, Disp-100 tablet, R-5Normal  -     gabapentin (NEURONTIN) 300 MG capsule; Take 1 capsule by mouth 3 times daily for 200 days., Disp-300 capsule, R-1Normal  4. Traumatic injury of neck with impaired range of motion  Assessment & Plan:  Problem

## 2024-10-21 NOTE — ASSESSMENT & PLAN NOTE
Problem and/or Symptoms are currently not stable and/or well controlled on current treatment plan. Will have patient follow up as directed and make the following changes for further evaluation and/or treatment:     Cont Norvasc 10 mg QD & increasing Losartan from 50 to 75 mg QD

## 2025-01-22 ENCOUNTER — TELEPHONE (OUTPATIENT)
Dept: FAMILY MEDICINE CLINIC | Facility: CLINIC | Age: 55
End: 2025-01-22

## 2025-01-22 NOTE — TELEPHONE ENCOUNTER
Medication Refill Request    Name of Medication : clotrimazole-betamethasone (LOTRISONE)      Strength of Medication: 1-0.05 % cream    Directions: Sig: Apply topically 2 times daily as needed     30 day or 90 day supply: 30     Preferred Pharmacy: [facility]    Last Appt. Date: 10/21/2024    Next Appt. Date: 04/21/2025     Additional Information For Provider:

## 2025-01-27 NOTE — TELEPHONE ENCOUNTER
Yes, if last script was written 3 years ago, then pt should be seen to document that the problem has returned & that the prior Tx would still be appropriate. Can be a virtual visit or in person.

## 2025-01-27 NOTE — TELEPHONE ENCOUNTER
Pt is asking for refill for a medication that you order for him in 2022, has dx of fungal dermatitis.I was going to send the pt a scheduling ticket to be seen. Wanted to inform you first, Please advise tks :)

## 2025-01-29 DIAGNOSIS — I10 HYPERTENSION, UNSPECIFIED TYPE: ICD-10-CM

## 2025-01-29 RX ORDER — LOSARTAN POTASSIUM 50 MG/1
75 TABLET ORAL DAILY
Qty: 150 TABLET | Refills: 1 | Status: CANCELLED | OUTPATIENT
Start: 2025-01-29

## 2025-01-30 NOTE — TELEPHONE ENCOUNTER
If pt calls back he needs an appt for the cream last written 2022 and his losartan was already at the pharmacy waiting on him to call. The pharmacy will have the medication ready this evening thanks :)

## 2025-01-30 NOTE — TELEPHONE ENCOUNTER
Patient called in regards to this mychart refill request.     PT is completely out of this medication as he took this last pill yesterday.    Pt would like a call back than a [employer] message    -Please Advise

## 2025-04-15 ENCOUNTER — LAB (OUTPATIENT)
Dept: FAMILY MEDICINE CLINIC | Facility: CLINIC | Age: 55
End: 2025-04-15

## 2025-04-15 ENCOUNTER — CLINICAL SUPPORT (OUTPATIENT)
Dept: FAMILY MEDICINE CLINIC | Facility: CLINIC | Age: 55
End: 2025-04-15

## 2025-04-15 VITALS
HEART RATE: 71 BPM | HEIGHT: 68 IN | DIASTOLIC BLOOD PRESSURE: 78 MMHG | SYSTOLIC BLOOD PRESSURE: 125 MMHG | WEIGHT: 153.8 LBS | BODY MASS INDEX: 23.31 KG/M2 | TEMPERATURE: 98.5 F

## 2025-04-15 DIAGNOSIS — Z01.30 BLOOD PRESSURE CHECK: Primary | ICD-10-CM

## 2025-04-15 DIAGNOSIS — I10 HYPERTENSION, UNSPECIFIED TYPE: ICD-10-CM

## 2025-04-15 DIAGNOSIS — E78.5 HYPERLIPIDEMIA, UNSPECIFIED HYPERLIPIDEMIA TYPE: ICD-10-CM

## 2025-04-15 LAB
ALBUMIN SERPL-MCNC: 4.1 G/DL (ref 3.5–5)
ALBUMIN/GLOB SERPL: 1.4 (ref 1–1.9)
ALP SERPL-CCNC: 71 U/L (ref 40–129)
ALT SERPL-CCNC: 18 U/L (ref 8–55)
ANION GAP SERPL CALC-SCNC: 11 MMOL/L (ref 7–16)
AST SERPL-CCNC: 41 U/L (ref 15–37)
BILIRUB SERPL-MCNC: 0.4 MG/DL (ref 0–1.2)
BUN SERPL-MCNC: 19 MG/DL (ref 6–23)
CALCIUM SERPL-MCNC: 9.5 MG/DL (ref 8.8–10.2)
CHLORIDE SERPL-SCNC: 103 MMOL/L (ref 98–107)
CHOLEST SERPL-MCNC: 158 MG/DL (ref 0–200)
CO2 SERPL-SCNC: 26 MMOL/L (ref 20–29)
CREAT SERPL-MCNC: 0.88 MG/DL (ref 0.8–1.3)
GLOBULIN SER CALC-MCNC: 2.9 G/DL (ref 2.3–3.5)
GLUCOSE SERPL-MCNC: 99 MG/DL (ref 70–99)
HDLC SERPL-MCNC: 69 MG/DL (ref 40–60)
HDLC SERPL: 2.3 (ref 0–5)
LDLC SERPL CALC-MCNC: 71 MG/DL (ref 0–100)
POTASSIUM SERPL-SCNC: 4.2 MMOL/L (ref 3.5–5.1)
PROT SERPL-MCNC: 7 G/DL (ref 6.3–8.2)
SODIUM SERPL-SCNC: 140 MMOL/L (ref 136–145)
TRIGL SERPL-MCNC: 93 MG/DL (ref 0–150)
VLDLC SERPL CALC-MCNC: 19 MG/DL (ref 6–23)

## 2025-04-21 ENCOUNTER — TELEMEDICINE (OUTPATIENT)
Dept: FAMILY MEDICINE CLINIC | Facility: CLINIC | Age: 55
End: 2025-04-21
Payer: COMMERCIAL

## 2025-04-21 DIAGNOSIS — S13.4XXA TRAUMATIC INJURY OF NECK WITH IMPAIRED RANGE OF MOTION: ICD-10-CM

## 2025-04-21 DIAGNOSIS — E78.5 HYPERLIPIDEMIA, UNSPECIFIED HYPERLIPIDEMIA TYPE: ICD-10-CM

## 2025-04-21 DIAGNOSIS — I10 HYPERTENSION, UNSPECIFIED TYPE: Primary | ICD-10-CM

## 2025-04-21 DIAGNOSIS — G47.00 INSOMNIA, UNSPECIFIED TYPE: ICD-10-CM

## 2025-04-21 DIAGNOSIS — M15.9 OSTEOARTHRITIS OF MULTIPLE JOINTS, UNSPECIFIED OSTEOARTHRITIS TYPE: ICD-10-CM

## 2025-04-21 DIAGNOSIS — Z87.898 HISTORY OF ALCOHOL USE DISORDER: ICD-10-CM

## 2025-04-21 DIAGNOSIS — N52.9 ERECTILE DYSFUNCTION, UNSPECIFIED ERECTILE DYSFUNCTION TYPE: ICD-10-CM

## 2025-04-21 DIAGNOSIS — S19.9XXA TRAUMATIC INJURY OF NECK WITH IMPAIRED RANGE OF MOTION: ICD-10-CM

## 2025-04-21 PROCEDURE — 99214 OFFICE O/P EST MOD 30 MIN: CPT | Performed by: FAMILY MEDICINE

## 2025-04-21 RX ORDER — FOLIC ACID 1 MG/1
1 TABLET ORAL DAILY
Qty: 100 TABLET | Refills: 1 | Status: SHIPPED | OUTPATIENT
Start: 2025-04-21

## 2025-04-21 RX ORDER — HYDROXYZINE PAMOATE 50 MG/1
50 CAPSULE ORAL
Qty: 100 CAPSULE | Refills: 1 | Status: SHIPPED | OUTPATIENT
Start: 2025-04-21

## 2025-04-21 RX ORDER — SILDENAFIL CITRATE 20 MG/1
TABLET ORAL
Qty: 50 TABLET | Refills: 5 | Status: SHIPPED | OUTPATIENT
Start: 2025-04-21

## 2025-04-21 RX ORDER — LOSARTAN POTASSIUM 50 MG/1
75 TABLET ORAL DAILY
Qty: 150 TABLET | Refills: 1 | Status: SHIPPED | OUTPATIENT
Start: 2025-04-21

## 2025-04-21 RX ORDER — ROSUVASTATIN CALCIUM 10 MG/1
10 TABLET, COATED ORAL EVERY EVENING
Qty: 100 TABLET | Refills: 1 | Status: SHIPPED | OUTPATIENT
Start: 2025-04-21

## 2025-04-21 RX ORDER — CYCLOBENZAPRINE HCL 10 MG
10 TABLET ORAL 3 TIMES DAILY PRN
Qty: 90 TABLET | Refills: 5 | Status: SHIPPED | OUTPATIENT
Start: 2025-04-21

## 2025-04-21 RX ORDER — AMLODIPINE BESYLATE 10 MG/1
10 TABLET ORAL DAILY
Qty: 100 TABLET | Refills: 1 | Status: SHIPPED | OUTPATIENT
Start: 2025-04-21

## 2025-04-21 RX ORDER — CELECOXIB 200 MG/1
200 CAPSULE ORAL 2 TIMES DAILY
Qty: 200 CAPSULE | Refills: 1 | Status: SHIPPED | OUTPATIENT
Start: 2025-04-21

## 2025-04-21 RX ORDER — GABAPENTIN 300 MG/1
300 CAPSULE ORAL 3 TIMES DAILY
Qty: 300 CAPSULE | Refills: 1 | Status: SHIPPED | OUTPATIENT
Start: 2025-04-21 | End: 2025-11-07

## 2025-04-21 SDOH — ECONOMIC STABILITY: INCOME INSECURITY: IN THE LAST 12 MONTHS, WAS THERE A TIME WHEN YOU WERE NOT ABLE TO PAY THE MORTGAGE OR RENT ON TIME?: NO

## 2025-04-21 SDOH — ECONOMIC STABILITY: FOOD INSECURITY: WITHIN THE PAST 12 MONTHS, THE FOOD YOU BOUGHT JUST DIDN'T LAST AND YOU DIDN'T HAVE MONEY TO GET MORE.: NEVER TRUE

## 2025-04-21 SDOH — ECONOMIC STABILITY: FOOD INSECURITY: WITHIN THE PAST 12 MONTHS, YOU WORRIED THAT YOUR FOOD WOULD RUN OUT BEFORE YOU GOT MONEY TO BUY MORE.: NEVER TRUE

## 2025-04-21 SDOH — ECONOMIC STABILITY: TRANSPORTATION INSECURITY
IN THE PAST 12 MONTHS, HAS THE LACK OF TRANSPORTATION KEPT YOU FROM MEDICAL APPOINTMENTS OR FROM GETTING MEDICATIONS?: NO

## 2025-04-21 ASSESSMENT — PATIENT HEALTH QUESTIONNAIRE - PHQ9
6. FEELING BAD ABOUT YOURSELF - OR THAT YOU ARE A FAILURE OR HAVE LET YOURSELF OR YOUR FAMILY DOWN: NOT AT ALL
SUM OF ALL RESPONSES TO PHQ QUESTIONS 1-9: 0
8. MOVING OR SPEAKING SO SLOWLY THAT OTHER PEOPLE COULD HAVE NOTICED. OR THE OPPOSITE, BEING SO FIGETY OR RESTLESS THAT YOU HAVE BEEN MOVING AROUND A LOT MORE THAN USUAL: NOT AT ALL
2. FEELING DOWN, DEPRESSED OR HOPELESS: NOT AT ALL
3. TROUBLE FALLING OR STAYING ASLEEP: NOT AT ALL
1. LITTLE INTEREST OR PLEASURE IN DOING THINGS: NOT AT ALL
4. FEELING TIRED OR HAVING LITTLE ENERGY: NOT AT ALL
SUM OF ALL RESPONSES TO PHQ QUESTIONS 1-9: 0
SUM OF ALL RESPONSES TO PHQ QUESTIONS 1-9: 0
7. TROUBLE CONCENTRATING ON THINGS, SUCH AS READING THE NEWSPAPER OR WATCHING TELEVISION: NOT AT ALL
9. THOUGHTS THAT YOU WOULD BE BETTER OFF DEAD, OR OF HURTING YOURSELF: NOT AT ALL
SUM OF ALL RESPONSES TO PHQ QUESTIONS 1-9: 0
5. POOR APPETITE OR OVEREATING: NOT AT ALL
10. IF YOU CHECKED OFF ANY PROBLEMS, HOW DIFFICULT HAVE THESE PROBLEMS MADE IT FOR YOU TO DO YOUR WORK, TAKE CARE OF THINGS AT HOME, OR GET ALONG WITH OTHER PEOPLE: NOT DIFFICULT AT ALL

## 2025-04-21 NOTE — PROGRESS NOTES
[place] [address]  Phone: [phone]  Fax: [phone]  Email: [email or web address]      Encounter Info  Jules Denilson Chu; Established patient 54 y.o.male; seen 4/21/2025 for: 6 Month Follow-Up      Assessment & Plan    1. Hypertension, unspecified type  -     losartan (COZAAR) 50 MG tablet; Take 1.5 tablets by mouth daily, Disp-150 tablet, R-1Normal  -     amLODIPine (NORVASC) 10 MG tablet; Take 1 tablet by mouth daily, Disp-100 tablet, R-1Normal  2. Hyperlipidemia, unspecified hyperlipidemia type  -     rosuvastatin (CRESTOR) 10 MG tablet; Take 1 tablet by mouth every evening, Disp-100 tablet, R-1Normal  3. Insomnia, unspecified type  -     hydrOXYzine pamoate (VISTARIL) 50 MG capsule; Take 1 capsule by mouth nightly, Disp-100 capsule, R-1Normal  4. Osteoarthritis of multiple joints, unspecified osteoarthritis type  -     gabapentin (NEURONTIN) 300 MG capsule; Take 1 capsule by mouth 3 times daily for 200 days., Disp-300 capsule, R-1Normal  -     celecoxib (CELEBREX) 200 MG capsule; Take 1 capsule by mouth 2 times daily, Disp-200 capsule, R-1Normal  -     cyclobenzaprine (FLEXERIL) 10 MG tablet; Take 1 tablet by mouth 3 times daily as needed for Muscle spasms, Disp-90 tablet, R-5Normal  5. Traumatic injury of neck with impaired range of motion  -     gabapentin (NEURONTIN) 300 MG capsule; Take 1 capsule by mouth 3 times daily for 200 days., Disp-300 capsule, R-1Normal  -     celecoxib (CELEBREX) 200 MG capsule; Take 1 capsule by mouth 2 times daily, Disp-200 capsule, R-1Normal  -     cyclobenzaprine (FLEXERIL) 10 MG tablet; Take 1 tablet by mouth 3 times daily as needed for Muscle spasms, Disp-90 tablet, R-5Normal  6. History of alcohol use disorder  -     folic acid (FOLVITE) 1 MG tablet; Take 1 tablet by mouth daily, Disp-100 tablet, R-1Normal  7. Erectile dysfunction, unspecified erectile dysfunction type  -     sildenafil (REVATIO) 20 MG tablet; Take 1 to

## 2025-05-12 ENCOUNTER — OFFICE VISIT (OUTPATIENT)
Dept: FAMILY MEDICINE CLINIC | Facility: CLINIC | Age: 55
End: 2025-05-12
Payer: COMMERCIAL

## 2025-05-12 VITALS
WEIGHT: 153.2 LBS | SYSTOLIC BLOOD PRESSURE: 120 MMHG | HEIGHT: 68 IN | DIASTOLIC BLOOD PRESSURE: 83 MMHG | HEART RATE: 112 BPM | BODY MASS INDEX: 23.22 KG/M2 | TEMPERATURE: 98.1 F

## 2025-05-12 DIAGNOSIS — M15.9 OSTEOARTHRITIS OF MULTIPLE JOINTS, UNSPECIFIED OSTEOARTHRITIS TYPE: ICD-10-CM
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
## 2025-05-12 DIAGNOSIS — S41.159A DOG BITE OF UPPER EXTREMITY, UNSPECIFIED LATERALITY, INITIAL ENCOUNTER: Primary | ICD-10-CM

## 2025-05-12 PROCEDURE — 3074F SYST BP LT 130 MM HG: CPT | Performed by: FAMILY MEDICINE

## 2025-05-12 PROCEDURE — 3079F DIAST BP 80-89 MM HG: CPT | Performed by: FAMILY MEDICINE

## 2025-05-12 PROCEDURE — 99214 OFFICE O/P EST MOD 30 MIN: CPT | Performed by: FAMILY MEDICINE

## 2025-05-12 RX ORDER — SODIUM FLUORIDE AND POTASSIUM NITRATE 5.8; 57.5 MG/ML; MG/ML
GEL, DENTIFRICE DENTAL
COMMUNITY
Start: 2025-04-08

## 2025-05-12 NOTE — PROGRESS NOTES
[place] [address]  Phone: [phone]  Fax: [phone]  Email: [email or web address]      Encounter Info  Jules Sage; Established patient 54 y.o.male; seen 5/12/2025 for: Letter for School/Work (Was attacked by a their rescue dog, both wrist were bitten, job requires to twist and turn and part)      Assessment & Plan    1. Dog bite of upper extremity, unspecified laterality, initial encounter  2. Osteoarthritis of multiple joints, unspecified osteoarthritis type    Problem and/or Symptoms are new to provider. Will have patient follow up as directed and make the following plan for further evaluation and/or treatment:    Pt already started on PPX Abx from the ER visit yesterday; advised him to cont full PPX regimen as it was appropriate for dog bite (Augmentin). Advised pt to use previously prescribed anti-inflammatory Celebrex for his OA in this situation now to help decrease local swelling & provide pain relief.     Also provided pt with a RTW letter for 2 days from now & with a weight lifting restriction of no more than 15 lbs for the next week.      Check Out Instructions  Return in about 5 months (around 10/21/2025) for IP: Annual Exam, Previsit Labs (add Vitals if Virtual).      Subjective & Objective    HPI  Pt seen today as an ER f/u from yesterday, where he was evaluated for acute dog bites to both distal forearms/wrists from a young Husky he & his wife had recently rescued. Pt needed a single laceration closed with 2 simple sutures, but otherwise suffered blunt force injury, scattered small lacerations, & significant bruising of both areas, which are wrapped today. Pt reports pain with moving his wrists but maintains normal sensation &  strength.     Review of Systems    Physical Exam  Musculoskeletal:      Right forearm: Laceration and tenderness present.      Left forearm: Laceration and tenderness present.      Right wrist: Laceration and

## 2025-08-19 ENCOUNTER — HOSPITAL ENCOUNTER (OUTPATIENT)
Dept: RADIATION ONCOLOGY | Age: 55
Setting detail: RECURRING SERIES
Discharge: HOME | End: 2025-08-22
Payer: COMMERCIAL

## 2025-08-19 VITALS
BODY MASS INDEX: 21.98 KG/M2 | RESPIRATION RATE: 18 BRPM | SYSTOLIC BLOOD PRESSURE: 110 MMHG | OXYGEN SATURATION: 91 % | WEIGHT: 145 LBS | HEIGHT: 68 IN | DIASTOLIC BLOOD PRESSURE: 67 MMHG | TEMPERATURE: 97.7 F | HEART RATE: 42 BPM

## 2025-08-19 DIAGNOSIS — C41.1 MALIGNANT NEOPLASM OF MANDIBLE (HCC): Primary | ICD-10-CM

## 2025-08-19 DIAGNOSIS — C76.0 HEAD AND NECK CANCER (HCC): Primary | ICD-10-CM

## 2025-08-19 DIAGNOSIS — C41.1 SQUAMOUS CELL CARCINOMA OF MANDIBLE (HCC): Primary | ICD-10-CM

## 2025-08-19 PROCEDURE — 99211 OFF/OP EST MAY X REQ PHY/QHP: CPT

## 2025-08-19 RX ORDER — CHLORHEXIDINE GLUCONATE ORAL RINSE 1.2 MG/ML
15 SOLUTION DENTAL 2 TIMES DAILY
COMMUNITY
Start: 2025-08-13

## 2025-08-19 RX ORDER — ESCITALOPRAM OXALATE 10 MG
10 TABLET ORAL
COMMUNITY
Start: 2025-08-01

## 2025-08-20 DIAGNOSIS — Z85.819 HISTORY OF ORAL CANCER: Primary | ICD-10-CM

## 2025-08-21 ENCOUNTER — TELEPHONE (OUTPATIENT)
Dept: ONCOLOGY | Age: 55
End: 2025-08-21

## 2025-08-23 ENCOUNTER — APPOINTMENT (OUTPATIENT)
Dept: GENERAL RADIOLOGY | Age: 55
End: 2025-08-23
Payer: COMMERCIAL

## 2025-08-23 ENCOUNTER — HOSPITAL ENCOUNTER (EMERGENCY)
Age: 55
Discharge: HOME | End: 2025-08-23
Attending: EMERGENCY MEDICINE
Payer: COMMERCIAL

## 2025-08-23 ENCOUNTER — APPOINTMENT (OUTPATIENT)
Dept: CT IMAGING | Age: 55
End: 2025-08-23
Payer: COMMERCIAL

## 2025-08-23 VITALS
OXYGEN SATURATION: 97 % | DIASTOLIC BLOOD PRESSURE: 97 MMHG | SYSTOLIC BLOOD PRESSURE: 129 MMHG | TEMPERATURE: 98.7 F | WEIGHT: 150 LBS | BODY MASS INDEX: 21.47 KG/M2 | HEART RATE: 80 BPM | RESPIRATION RATE: 14 BRPM | HEIGHT: 70 IN

## 2025-08-23 DIAGNOSIS — L02.11 CELLULITIS AND ABSCESS OF NECK: Primary | ICD-10-CM

## 2025-08-23 DIAGNOSIS — L03.114 CELLULITIS OF LEFT FOREARM: ICD-10-CM

## 2025-08-23 DIAGNOSIS — T81.40XA POSTOPERATIVE INFECTION, UNSPECIFIED TYPE, INITIAL ENCOUNTER: ICD-10-CM

## 2025-08-23 DIAGNOSIS — L03.221 CELLULITIS AND ABSCESS OF NECK: Primary | ICD-10-CM

## 2025-08-23 PROBLEM — R06.89 OTHER ABNORMALITIES OF BREATHING: Status: ACTIVE | Noted: 2025-08-06

## 2025-08-23 PROBLEM — Z98.890 OTHER SPECIFIED POSTPROCEDURAL STATES: Status: ACTIVE | Noted: 2025-08-08

## 2025-08-23 PROBLEM — R13.12 DYSPHAGIA, OROPHARYNGEAL PHASE: Status: ACTIVE | Noted: 2025-08-12

## 2025-08-23 PROBLEM — C03.9: Status: ACTIVE | Noted: 2025-08-05

## 2025-08-23 PROBLEM — C04.9: Status: ACTIVE | Noted: 2025-08-06

## 2025-08-23 PROBLEM — C41.1 MALIGNANT NEOPLASM OF MANDIBLE (HCC): Status: ACTIVE | Noted: 2025-08-06

## 2025-08-23 PROBLEM — F10.10 ALCOHOL ABUSE, UNCOMPLICATED: Status: ACTIVE | Noted: 2025-08-06

## 2025-08-23 LAB
ALBUMIN SERPL-MCNC: 2.9 G/DL (ref 3.5–5)
ALBUMIN/GLOB SERPL: 0.7 (ref 1–1.9)
ALP SERPL-CCNC: 72 U/L (ref 40–129)
ALT SERPL-CCNC: 21 U/L (ref 8–55)
ANION GAP SERPL CALC-SCNC: 16 MMOL/L (ref 7–16)
AST SERPL-CCNC: 22 U/L (ref 15–37)
BASOPHILS # BLD: 0.07 K/UL (ref 0–0.2)
BASOPHILS NFR BLD: 0.8 % (ref 0–2)
BILIRUB SERPL-MCNC: <0.2 MG/DL (ref 0–1.2)
BUN SERPL-MCNC: 22 MG/DL (ref 6–23)
CALCIUM SERPL-MCNC: 9.2 MG/DL (ref 8.8–10.2)
CHLORIDE SERPL-SCNC: 97 MMOL/L (ref 98–107)
CO2 SERPL-SCNC: 24 MMOL/L (ref 20–29)
CREAT SERPL-MCNC: 0.76 MG/DL (ref 0.8–1.3)
DIFFERENTIAL METHOD BLD: ABNORMAL
EOSINOPHIL # BLD: 0.23 K/UL (ref 0–0.8)
EOSINOPHIL NFR BLD: 2.5 % (ref 0.5–7.8)
ERYTHROCYTE [DISTWIDTH] IN BLOOD BY AUTOMATED COUNT: 13.8 % (ref 11.9–14.6)
GLOBULIN SER CALC-MCNC: 4.2 G/DL (ref 2.3–3.5)
GLUCOSE SERPL-MCNC: 150 MG/DL (ref 70–99)
HCT VFR BLD AUTO: 28.8 % (ref 41.1–50.3)
HGB BLD-MCNC: 9.4 G/DL (ref 13.6–17.2)
IMM GRANULOCYTES # BLD AUTO: 0.02 K/UL (ref 0–0.5)
IMM GRANULOCYTES NFR BLD AUTO: 0.2 % (ref 0–5)
LACTATE SERPL-SCNC: 1.9 MMOL/L (ref 0.5–2)
LYMPHOCYTES # BLD: 0.48 K/UL (ref 0.5–4.6)
LYMPHOCYTES NFR BLD: 5.2 % (ref 13–44)
MCH RBC QN AUTO: 28.7 PG (ref 26.1–32.9)
MCHC RBC AUTO-ENTMCNC: 32.6 G/DL (ref 31.4–35)
MCV RBC AUTO: 88.1 FL (ref 82–102)
MONOCYTES # BLD: 0.79 K/UL (ref 0.1–1.3)
MONOCYTES NFR BLD: 8.5 % (ref 4–12)
NEUTS SEG # BLD: 7.66 K/UL (ref 1.7–8.2)
NEUTS SEG NFR BLD: 82.8 % (ref 43–78)
NRBC # BLD: 0 K/UL (ref 0–0.2)
PLATELET # BLD AUTO: 667 K/UL (ref 150–450)
PMV BLD AUTO: 8.4 FL (ref 9.4–12.3)
POTASSIUM SERPL-SCNC: 4 MMOL/L (ref 3.5–5.1)
PROCALCITONIN SERPL-MCNC: 0.19 NG/ML (ref 0–0.1)
PROT SERPL-MCNC: 7.1 G/DL (ref 6.3–8.2)
RBC # BLD AUTO: 3.27 M/UL (ref 4.23–5.6)
SODIUM SERPL-SCNC: 137 MMOL/L (ref 136–145)
WBC # BLD AUTO: 9.3 K/UL (ref 4.3–11.1)

## 2025-08-23 PROCEDURE — 6370000000 HC RX 637 (ALT 250 FOR IP)

## 2025-08-23 PROCEDURE — 87077 CULTURE AEROBIC IDENTIFY: CPT

## 2025-08-23 PROCEDURE — 99285 EMERGENCY DEPT VISIT HI MDM: CPT

## 2025-08-23 PROCEDURE — 93005 ELECTROCARDIOGRAM TRACING: CPT

## 2025-08-23 PROCEDURE — 96375 TX/PRO/DX INJ NEW DRUG ADDON: CPT

## 2025-08-23 PROCEDURE — 2580000003 HC RX 258

## 2025-08-23 PROCEDURE — 87040 BLOOD CULTURE FOR BACTERIA: CPT

## 2025-08-23 PROCEDURE — 96366 THER/PROPH/DIAG IV INF ADDON: CPT

## 2025-08-23 PROCEDURE — 6360000004 HC RX CONTRAST MEDICATION

## 2025-08-23 PROCEDURE — 70491 CT SOFT TISSUE NECK W/DYE: CPT

## 2025-08-23 PROCEDURE — 87205 SMEAR GRAM STAIN: CPT

## 2025-08-23 PROCEDURE — 85025 COMPLETE CBC W/AUTO DIFF WBC: CPT

## 2025-08-23 PROCEDURE — 96365 THER/PROPH/DIAG IV INF INIT: CPT

## 2025-08-23 PROCEDURE — 87186 SC STD MICRODIL/AGAR DIL: CPT

## 2025-08-23 PROCEDURE — 83605 ASSAY OF LACTIC ACID: CPT

## 2025-08-23 PROCEDURE — 84145 PROCALCITONIN (PCT): CPT

## 2025-08-23 PROCEDURE — 87070 CULTURE OTHR SPECIMN AEROBIC: CPT

## 2025-08-23 PROCEDURE — 96368 THER/DIAG CONCURRENT INF: CPT

## 2025-08-23 PROCEDURE — 80053 COMPREHEN METABOLIC PANEL: CPT

## 2025-08-23 PROCEDURE — 71045 X-RAY EXAM CHEST 1 VIEW: CPT

## 2025-08-23 PROCEDURE — 73090 X-RAY EXAM OF FOREARM: CPT

## 2025-08-23 PROCEDURE — 6360000002 HC RX W HCPCS

## 2025-08-23 RX ORDER — ONDANSETRON 2 MG/ML
4 INJECTION INTRAMUSCULAR; INTRAVENOUS
Status: COMPLETED | OUTPATIENT
Start: 2025-08-23 | End: 2025-08-23

## 2025-08-23 RX ORDER — SODIUM CHLORIDE, SODIUM LACTATE, POTASSIUM CHLORIDE, AND CALCIUM CHLORIDE .6; .31; .03; .02 G/100ML; G/100ML; G/100ML; G/100ML
30 INJECTION, SOLUTION INTRAVENOUS ONCE
Status: COMPLETED | OUTPATIENT
Start: 2025-08-23 | End: 2025-08-23

## 2025-08-23 RX ORDER — HYDROCODONE BITARTRATE AND ACETAMINOPHEN 5; 325 MG/1; MG/1
1 TABLET ORAL
Refills: 0 | Status: COMPLETED | OUTPATIENT
Start: 2025-08-23 | End: 2025-08-23

## 2025-08-23 RX ORDER — ONDANSETRON 4 MG/1
4 TABLET, ORALLY DISINTEGRATING ORAL
Status: COMPLETED | OUTPATIENT
Start: 2025-08-23 | End: 2025-08-23

## 2025-08-23 RX ORDER — IOPAMIDOL 755 MG/ML
80 INJECTION, SOLUTION INTRAVASCULAR
Status: COMPLETED | OUTPATIENT
Start: 2025-08-23 | End: 2025-08-23

## 2025-08-23 RX ADMIN — HYDROMORPHONE HYDROCHLORIDE 0.5 MG: 1 INJECTION, SOLUTION INTRAMUSCULAR; INTRAVENOUS; SUBCUTANEOUS at 13:36

## 2025-08-23 RX ADMIN — ONDANSETRON 4 MG: 2 INJECTION, SOLUTION INTRAMUSCULAR; INTRAVENOUS at 13:36

## 2025-08-23 RX ADMIN — SODIUM CHLORIDE 1500 MG: 9 INJECTION, SOLUTION INTRAVENOUS at 12:58

## 2025-08-23 RX ADMIN — IOPAMIDOL 80 ML: 755 INJECTION, SOLUTION INTRAVENOUS at 13:58

## 2025-08-23 RX ADMIN — HYDROCODONE BITARTRATE AND ACETAMINOPHEN 1 TABLET: 5; 325 TABLET ORAL at 16:38

## 2025-08-23 RX ADMIN — SODIUM CHLORIDE, SODIUM LACTATE, POTASSIUM CHLORIDE, AND CALCIUM CHLORIDE 2040 ML: .6; .31; .03; .02 INJECTION, SOLUTION INTRAVENOUS at 13:11

## 2025-08-23 RX ADMIN — ONDANSETRON 4 MG: 4 TABLET, ORALLY DISINTEGRATING ORAL at 16:39

## 2025-08-23 RX ADMIN — PIPERACILLIN AND TAZOBACTAM 4500 MG: 4; .5 INJECTION, POWDER, FOR SOLUTION INTRAVENOUS at 13:40

## 2025-08-23 ASSESSMENT — LIFESTYLE VARIABLES
HOW OFTEN DO YOU HAVE A DRINK CONTAINING ALCOHOL: NEVER
HOW MANY STANDARD DRINKS CONTAINING ALCOHOL DO YOU HAVE ON A TYPICAL DAY: PATIENT DOES NOT DRINK

## 2025-08-24 LAB
BACTERIA SPEC CULT: NORMAL
EKG ATRIAL RATE: 82 BPM
EKG DIAGNOSIS: NORMAL
EKG P AXIS: 68 DEGREES
EKG P-R INTERVAL: 143 MS
EKG Q-T INTERVAL: 363 MS
EKG QRS DURATION: 87 MS
EKG QTC CALCULATION (BAZETT): 424 MS
EKG R AXIS: 79 DEGREES
EKG T AXIS: 67 DEGREES
EKG VENTRICULAR RATE: 82 BPM
GRAM STN SPEC: NORMAL
SERVICE CMNT-IMP: NORMAL

## 2025-08-24 PROCEDURE — 93010 ELECTROCARDIOGRAM REPORT: CPT | Performed by: INTERNAL MEDICINE

## 2025-08-26 LAB
BACTERIA SPEC CULT: ABNORMAL
GRAM STN SPEC: ABNORMAL
SERVICE CMNT-IMP: ABNORMAL
SERVICE CMNT-IMP: ABNORMAL

## 2025-08-28 DIAGNOSIS — C76.0 HEAD AND NECK CANCER (HCC): ICD-10-CM

## 2025-08-28 LAB
BACTERIA SPEC CULT: NORMAL
BACTERIA SPEC CULT: NORMAL
SERVICE CMNT-IMP: NORMAL
SERVICE CMNT-IMP: NORMAL

## 2025-08-29 DIAGNOSIS — C76.0 HEAD AND NECK CANCER (HCC): ICD-10-CM

## 2025-08-29 DIAGNOSIS — C41.1 MALIGNANT NEOPLASM OF MANDIBLE (HCC): Primary | ICD-10-CM

## 2025-09-04 ENCOUNTER — HOSPITAL ENCOUNTER (OUTPATIENT)
Dept: CT IMAGING | Age: 55
Discharge: HOME | End: 2025-09-06
Attending: STUDENT IN AN ORGANIZED HEALTH CARE EDUCATION/TRAINING PROGRAM
Payer: COMMERCIAL

## 2025-09-04 ENCOUNTER — HOSPITAL ENCOUNTER (OUTPATIENT)
Dept: WOUND CARE | Age: 55
Discharge: HOME | End: 2025-09-04
Payer: COMMERCIAL

## 2025-09-04 VITALS
HEIGHT: 70 IN | BODY MASS INDEX: 20.04 KG/M2 | SYSTOLIC BLOOD PRESSURE: 142 MMHG | WEIGHT: 140 LBS | DIASTOLIC BLOOD PRESSURE: 85 MMHG | HEART RATE: 87 BPM

## 2025-09-04 DIAGNOSIS — C76.0 HEAD AND NECK CANCER (HCC): ICD-10-CM

## 2025-09-04 PROCEDURE — 11042 DBRDMT SUBQ TIS 1ST 20SQCM/<: CPT

## 2025-09-04 PROCEDURE — 6360000004 HC RX CONTRAST MEDICATION: Performed by: STUDENT IN AN ORGANIZED HEALTH CARE EDUCATION/TRAINING PROGRAM

## 2025-09-04 PROCEDURE — 71260 CT THORAX DX C+: CPT

## 2025-09-04 PROCEDURE — 99213 OFFICE O/P EST LOW 20 MIN: CPT

## 2025-09-04 RX ORDER — SILVER SULFADIAZINE 10 MG/G
CREAM TOPICAL
Qty: 50 G | Refills: 2 | Status: SHIPPED | OUTPATIENT
Start: 2025-09-04

## 2025-09-04 RX ORDER — IOPAMIDOL 755 MG/ML
80 INJECTION, SOLUTION INTRAVASCULAR
Status: COMPLETED | OUTPATIENT
Start: 2025-09-04 | End: 2025-09-04

## 2025-09-04 RX ADMIN — IOPAMIDOL 80 ML: 755 INJECTION, SOLUTION INTRAVENOUS at 15:49

## 2025-09-04 ASSESSMENT — PAIN SCALES - GENERAL: PAINLEVEL_OUTOF10: 0
